# Patient Record
Sex: MALE | Race: WHITE | Employment: PART TIME | URBAN - METROPOLITAN AREA
[De-identification: names, ages, dates, MRNs, and addresses within clinical notes are randomized per-mention and may not be internally consistent; named-entity substitution may affect disease eponyms.]

---

## 2017-09-05 PROBLEM — R51.9 HEAD ACHE: Status: ACTIVE | Noted: 2017-09-05

## 2017-09-05 PROBLEM — R20.0 LT FACIAL NUMBNESS: Status: ACTIVE | Noted: 2017-09-05

## 2017-12-06 NOTE — PROGRESS NOTES
Obstructive Sleep Apnea (NEL) Screening     Patient:  Hossein Morin    YOB: 1955      Medical Record #:  [id number]                     Date:  12/6/2017     1. Are you a loud and/or regular snorer? []  Yes       [x] No    2. Have you been observed to gasp or stop breathing during sleep? []  Yes       [x] No    3. Do you feel tired or groggy upon awakening or do you awaken with a headache?           []  Yes       [x] No    4. Are you often tired or fatigued during the wake time hours? []  Yes       [x] No    5. Do you fall asleep sitting, reading, watching TV or driving? []  Yes       [x] No    6. Do you often have problems with memory or concentration? []  Yes       [x] No    **If patient's score is ? 3 they are considered high risk for NEL. Notify the anesthesiologist of the high risk and document in focus note. Note:  If the patient's BMI is more than 35 kg m¯² , has neck circumference > 40 cm, and/or high blood pressure the risk is greater (© American [employer], 2006).

## 2017-12-06 NOTE — PRE-PROCEDURE INSTRUCTIONS
PRE OP INSTRUCTION SHEET   1. Do not eat or drink anything after 12 midnight  prior to surgery. This includes no water, chewing gum or mints. 2. Take the following pills will a small sip of water (see MAR)                                        3. Aspirin, Ibuprofen, Advil, Naproxen, Vitamin E, fish oil and other Anti-inflammatory products should be stopped for 5 days before surgery or as directed by your physician. 4. Check with your Doctor regarding stopping Plavix, Coumadin, Lovenox, Fragmin or other blood thinners   5. Do not smoke, and do not drink any alcoholic beverages 24 hours prior to surgery. This includes NA Beer. 6. You may brush your teeth and gargle the morning of surgery. DO NOT SWALLOW WATER   7. You MUST make arrangements for a responsible adult to take you home after your surgery. You will not be allowed to leave alone or drive yourself home. It is strongly suggested someone stay with you the first 24 hrs. Your surgery will be cancelled if you do not have a ride home. 8. A parent/legal guardian must accompany a child scheduled for surgery and plan to stay at the hospital until the child is discharged. Please do not bring other children with you. 9. Please wear simple, loose fitting clothing to the hospital.  Mita Macdonaldcumb not bring valuables (money, credit cards, checkbooks, etc.) Do not wear any makeup (including no eye makeup) or nail polish on your fingers or toes. 10. DO NOT wear any jewelry or piercings on day of surgery. All body piercing jewelry must be removed. 11. If you have dentures,glasses, or contacts they will be removed before going to the OR; we will provide you a container. 12. Please see your family doctor/and cardiologist for a history & physical and/or concerning medications. Bring any test results/reports from your physician's office. Have history and labs faxed to [phone].  Remember to bring Blood Bank bracelet on the day of surgery. 14. If you have a Living Will and Durable Power of  for Healthcare, please bring in a copy. 13. Notify your Surgeon if you develop any illness between now and surgery  time, cough, cold, fever, sore throat, nausea, vomiting, etc.  Please notify your surgeon if you experience dizziness, shortness of breath or blurred vision between now & the time of your surgery   16. DO NOT shave your operative site 96 hours prior to surgery. For face & neck surgery, men may use an electric razor 48 hours prior to surgery. 17. Shower with _x__Antibacterial soap (x_chlorhexidine for total joint  Pt's) shower two times before surgery.(the morning of and the night before. 18. To provide excellent care visitors will be limited to one in the room at any given time.   Please call pre admission testing if you any further questions [phone] or [phone]

## 2017-12-08 ENCOUNTER — HOSPITAL ENCOUNTER (OUTPATIENT)
Dept: SURGERY | Age: 62
Discharge: HOME | End: 2017-12-08
Attending: UROLOGY | Admitting: UROLOGY

## 2017-12-08 VITALS
HEART RATE: 66 BPM | SYSTOLIC BLOOD PRESSURE: 122 MMHG | WEIGHT: 171 LBS | OXYGEN SATURATION: 100 % | DIASTOLIC BLOOD PRESSURE: 79 MMHG | BODY MASS INDEX: 25.33 KG/M2 | HEIGHT: 69 IN | TEMPERATURE: 98.7 F | RESPIRATION RATE: 18 BRPM

## 2017-12-08 RX ORDER — MORPHINE SULFATE 4 MG/ML
1 INJECTION, SOLUTION INTRAMUSCULAR; INTRAVENOUS EVERY 5 MIN PRN
Status: DISCONTINUED | OUTPATIENT
Start: 2017-12-08 | End: 2017-12-09 | Stop reason: HOSPADM

## 2017-12-08 RX ORDER — LABETALOL HYDROCHLORIDE 5 MG/ML
5 INJECTION, SOLUTION INTRAVENOUS EVERY 10 MIN PRN
Status: DISCONTINUED | OUTPATIENT
Start: 2017-12-08 | End: 2017-12-09 | Stop reason: HOSPADM

## 2017-12-08 RX ORDER — TOBRAMYCIN AND DEXAMETHASONE 3; 1 MG/ML; MG/ML
1 SUSPENSION/ DROPS OPHTHALMIC CONTINUOUS
Status: DISCONTINUED | OUTPATIENT
Start: 2017-12-08 | End: 2017-12-09 | Stop reason: HOSPADM

## 2017-12-08 RX ORDER — DIPHENHYDRAMINE HYDROCHLORIDE 50 MG/ML
12.5 INJECTION INTRAMUSCULAR; INTRAVENOUS
Status: ACTIVE | OUTPATIENT
Start: 2017-12-08 | End: 2017-12-08

## 2017-12-08 RX ORDER — PROMETHAZINE HYDROCHLORIDE 25 MG/ML
6.25 INJECTION, SOLUTION INTRAMUSCULAR; INTRAVENOUS
Status: DISCONTINUED | OUTPATIENT
Start: 2017-12-08 | End: 2017-12-09 | Stop reason: HOSPADM

## 2017-12-08 RX ORDER — ONDANSETRON 2 MG/ML
4 INJECTION INTRAMUSCULAR; INTRAVENOUS PRN
Status: DISCONTINUED | OUTPATIENT
Start: 2017-12-08 | End: 2017-12-09 | Stop reason: HOSPADM

## 2017-12-08 RX ORDER — HYDROMORPHONE HCL 110MG/55ML
0.25 PATIENT CONTROLLED ANALGESIA SYRINGE INTRAVENOUS EVERY 5 MIN PRN
Status: DISCONTINUED | OUTPATIENT
Start: 2017-12-08 | End: 2017-12-09 | Stop reason: HOSPADM

## 2017-12-08 RX ORDER — PREDNISOLONE ACETATE 10 MG/ML
1 SUSPENSION/ DROPS OPHTHALMIC CONTINUOUS
Status: DISCONTINUED | OUTPATIENT
Start: 2017-12-08 | End: 2017-12-09 | Stop reason: HOSPADM

## 2017-12-08 RX ORDER — SODIUM CHLORIDE, SODIUM LACTATE, POTASSIUM CHLORIDE, CALCIUM CHLORIDE 600; 310; 30; 20 MG/100ML; MG/100ML; MG/100ML; MG/100ML
INJECTION, SOLUTION INTRAVENOUS CONTINUOUS
Status: DISCONTINUED | OUTPATIENT
Start: 2017-12-08 | End: 2017-12-09 | Stop reason: HOSPADM

## 2017-12-08 RX ORDER — OXYCODONE HYDROCHLORIDE AND ACETAMINOPHEN 5; 325 MG/1; MG/1
2 TABLET ORAL PRN
Status: ACTIVE | OUTPATIENT
Start: 2017-12-08 | End: 2017-12-08

## 2017-12-08 RX ORDER — OXYCODONE HYDROCHLORIDE AND ACETAMINOPHEN 5; 325 MG/1; MG/1
1 TABLET ORAL PRN
Status: ACTIVE | OUTPATIENT
Start: 2017-12-08 | End: 2017-12-08

## 2017-12-08 RX ORDER — HYDROMORPHONE HCL 110MG/55ML
0.5 PATIENT CONTROLLED ANALGESIA SYRINGE INTRAVENOUS EVERY 5 MIN PRN
Status: DISCONTINUED | OUTPATIENT
Start: 2017-12-08 | End: 2017-12-09 | Stop reason: HOSPADM

## 2017-12-08 RX ORDER — MEPERIDINE HYDROCHLORIDE 25 MG/ML
12.5 INJECTION INTRAMUSCULAR; INTRAVENOUS; SUBCUTANEOUS EVERY 5 MIN PRN
Status: DISCONTINUED | OUTPATIENT
Start: 2017-12-08 | End: 2017-12-09 | Stop reason: HOSPADM

## 2017-12-08 RX ORDER — HYDRALAZINE HYDROCHLORIDE 20 MG/ML
5 INJECTION INTRAMUSCULAR; INTRAVENOUS EVERY 10 MIN PRN
Status: DISCONTINUED | OUTPATIENT
Start: 2017-12-08 | End: 2017-12-09 | Stop reason: HOSPADM

## 2017-12-08 RX ORDER — MORPHINE SULFATE 10 MG/ML
2 INJECTION, SOLUTION INTRAMUSCULAR; INTRAVENOUS EVERY 5 MIN PRN
Status: DISCONTINUED | OUTPATIENT
Start: 2017-12-08 | End: 2017-12-09 | Stop reason: HOSPADM

## 2017-12-08 RX ADMIN — SODIUM CHLORIDE, SODIUM LACTATE, POTASSIUM CHLORIDE, CALCIUM CHLORIDE: 600; 310; 30; 20 INJECTION, SOLUTION INTRAVENOUS at 07:07

## 2017-12-08 ASSESSMENT — PAIN SCALES - GENERAL: PAINLEVEL_OUTOF10: 0

## 2017-12-08 ASSESSMENT — PAIN - FUNCTIONAL ASSESSMENT: PAIN_FUNCTIONAL_ASSESSMENT: 0-10

## 2017-12-08 NOTE — OP NOTE
OPERATIVE NOTE    Patient Name   Date of Birth Age  MRNVitor Franco   [birth year]   66 y.o.  [id number]       Surgeon        Surgery Date  Shena Freitas        12/8/2017       Preoperative Diagnosis: Nuclear Sclerotic Cataract left eye    Postoperative Diagnosis: Nuclear Sclerotic Cataract left eye    Operative Procedure: Phacoemulsification/ Posterior Chamber Intraocular Lens Implantation          Anesthesia:   Peribulbar Block with MAC    Details of Procedure: The patient was brought to the operating room on the operative stretcher in the supine position with the head resting in the head rest.  After appropriate anesthesia monitoring devices were applied, IV sedation was carried out per the anesthesia service. Once sedation was achieved, a peribulbar block was performed, using a 5 mL combination solution containing 4 mL of 2% lidocaine with 1 mL of Vitrase. Approximately 2-3 mL of this solution was administered in a peribulbar fashion through the lower lid of the operative eye. Once appropriate akinesia and anesthesia were demonstrated, the operative eye was prepped and draped in the usual sterile fashion. Tobradex drops and Tetracain drops were administered. A wire lid speculum was then inserted into the operative eye. A paracentesis was then performed using a 15 degree supersharp blade to enter the globe at the limbus, and a .12 mm colibri forceps was used to stabilize the globe. Viscoat was then instilled into the anterior chamber. A temporal clear cornea groove was then created using the 15 degree supersharp blade. The cornea was then entered using the Collin 2.4 mm clearcut keratome blade. The capsulotomy was then performed using a cystotome, and the capsulorrhexis was completed using uttrata forceps. The nucleus of the cataract was then hydrodissected and hydrodelineated using sterile BSS on a 27 gauge cannula.   The nucleus of the cataract was then removed using the

## 2017-12-08 NOTE — ANESTHESIA POST-OP
Postoperative Anesthesia Note    Name:    Padilla Roberson  MRN:      [id number]    Patient Vitals for the past 12 hrs:   BP Temp Temp src Pulse Resp SpO2   12/08/17 0910 122/79 - - 66 18 100 %   12/08/17 0639 129/82 98.7 °F (37.1 °C) Temporal 61 16 99 %        LABS:    CBC  Lab Results   Component Value Date/Time    WBC 6.6 09/06/2017 08:10 AM    HGB 15.6 09/06/2017 08:10 AM    HCT 45.0 09/06/2017 08:10 AM     09/06/2017 08:10 AM     RENAL  Lab Results   Component Value Date/Time     09/06/2017 08:10 AM    K 4.0 09/06/2017 08:10 AM     09/06/2017 08:10 AM    CO2 26 09/06/2017 08:10 AM    BUN 14 09/06/2017 08:10 AM    CREATININE 0.8 09/06/2017 08:10 AM    GLUCOSE 95 09/06/2017 08:10 AM     COAGS  Lab Results   Component Value Date/Time    PROTIME 10.7 09/05/2017 01:35 AM    INR 0.95 09/05/2017 01:35 AM       Intake & Output: In: 400 [I.V.:400]  Out: -     Nausea & Vomiting:  No    Level of Consciousness:  Awake    Pain Assessment:  Adequate analgesia    Anesthesia Complications:  No apparent anesthetic complications    SUMMARY      Vital signs stable  OK to discharge from Stage I post anesthesia care.   Care transferred from Anesthesiology department on discharge from perioperative area

## 2017-12-08 NOTE — ANESTHESIA PRE-OP
Continuous Berny Philip MD        lactated ringers infusion   Intravenous Continuous Eldon Webster MD        lidocaine 1 % (PF) injection 0.1 mL  0.1 mL Intradermal Once PRN Eldon Webster MD        lidocaine PF 2 % in hylenex   Intraocular Once Sandeep Hill MD           Allergies: Allergies   Allergen Reactions    Cephalexin Swelling       Problem List:    Patient Active Problem List   Diagnosis Code    Head ache R51    Lt facial numbness R20.0    Acute nonintractable headache R51    TA (temporal arteritis) ([facility] [address] M31.6    HTN (hypertension), benign I10       Past Medical History:        Diagnosis Date    Abdominal pain     Degenerative disc disease     Hyperlipidemia     Hypertension        Past Surgical History:        Procedure Laterality Date    HERNIA REPAIR         Social History:    Social History   Substance Use Topics    Smoking status: Former Smoker    Smokeless tobacco: Never Used      Comment: quit 36 years ago; 1/2 per day for 10 years    Alcohol use No                                Counseling given: Not Answered      Vital Signs (Current):   Vitals:    12/06/17 1007 12/08/17 0639   BP:  129/82   Pulse:  61   Resp:  16   Temp:  98.7 °F (37.1 °C)   TempSrc:  Temporal   SpO2:  99%   Weight: 171 lb (77.6 kg)    Height: 5' 9\" (1.753 m)                                               BP Readings from Last 3 Encounters:   12/08/17 129/82   09/06/17 113/67   06/21/11 136/84       NPO Status: Time of last liquid consumption: 1800                        Time of last solid consumption: 1800                        Date of last liquid consumption: 12/07/17                        Date of last solid food consumption: 12/07/17    BMI:   Wt Readings from Last 3 Encounters:   12/06/17 171 lb (77.6 kg)   09/05/17 166 lb 10.7 oz (75.6 kg)   06/21/11 165 lb (74.8 kg)     Body mass index is 25.25 kg/m².     Anesthesia Evaluation  Patient summary reviewed and Nursing MD Hector   12/8/2017

## 2022-02-09 ENCOUNTER — APPOINTMENT (OUTPATIENT)
Dept: GENERAL RADIOLOGY | Age: 67
End: 2022-02-09
Payer: MEDICARE

## 2022-02-09 ENCOUNTER — APPOINTMENT (OUTPATIENT)
Dept: CT IMAGING | Age: 67
End: 2022-02-09
Payer: MEDICARE

## 2022-02-09 ENCOUNTER — HOSPITAL ENCOUNTER (EMERGENCY)
Age: 67
Discharge: HOME | End: 2022-02-09
Attending: EMERGENCY MEDICINE
Payer: MEDICARE

## 2022-02-09 VITALS
HEART RATE: 71 BPM | OXYGEN SATURATION: 99 % | BODY MASS INDEX: 23.85 KG/M2 | DIASTOLIC BLOOD PRESSURE: 83 MMHG | WEIGHT: 161 LBS | TEMPERATURE: 98.1 F | HEIGHT: 69 IN | SYSTOLIC BLOOD PRESSURE: 147 MMHG | RESPIRATION RATE: 15 BRPM
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
## 2022-02-09 DIAGNOSIS — R07.89 CHEST WALL PAIN: ICD-10-CM

## 2022-02-09 DIAGNOSIS — S62.102A CLOSED FRACTURE OF LEFT WRIST, INITIAL ENCOUNTER: Primary | ICD-10-CM

## 2022-02-09 LAB
A/G RATIO: 1.8 (ref 1.1–2.2)
ALBUMIN SERPL-MCNC: 4.5 G/DL (ref 3.4–5)
ALP BLD-CCNC: 89 U/L (ref 40–129)
ALT SERPL-CCNC: 13 U/L (ref 10–40)
ANION GAP SERPL CALCULATED.3IONS-SCNC: 10 MMOL/L (ref 3–16)
AST SERPL-CCNC: 14 U/L (ref 15–37)
BASOPHILS ABSOLUTE: 0.1 K/UL (ref 0–0.2)
BASOPHILS RELATIVE PERCENT: 1.2 %
BILIRUB SERPL-MCNC: 0.4 MG/DL (ref 0–1)
BUN BLDV-MCNC: 11 MG/DL (ref 7–20)
CALCIUM SERPL-MCNC: 9.6 MG/DL (ref 8.3–10.6)
CHLORIDE BLD-SCNC: 100 MMOL/L (ref 99–110)
CO2: 28 MMOL/L (ref 21–32)
CREAT SERPL-MCNC: 0.9 MG/DL (ref 0.8–1.3)
EOSINOPHILS ABSOLUTE: 0.2 K/UL (ref 0–0.6)
EOSINOPHILS RELATIVE PERCENT: 2.9 %
GFR AFRICAN AMERICAN: >60
GFR NON-AFRICAN AMERICAN: >60
GLUCOSE BLD-MCNC: 92 MG/DL (ref 70–99)
HCT VFR BLD CALC: 40.4 % (ref 40.5–52.5)
HEMOGLOBIN: 13.9 G/DL (ref 13.5–17.5)
LYMPHOCYTES ABSOLUTE: 2.1 K/UL (ref 1–5.1)
LYMPHOCYTES RELATIVE PERCENT: 38.4 %
MCH RBC QN AUTO: 33.1 PG (ref 26–34)
MCHC RBC AUTO-ENTMCNC: 34.4 G/DL (ref 31–36)
MCV RBC AUTO: 96.2 FL (ref 80–100)
MONOCYTES ABSOLUTE: 0.4 K/UL (ref 0–1.3)
MONOCYTES RELATIVE PERCENT: 6.9 %
NEUTROPHILS ABSOLUTE: 2.8 K/UL (ref 1.7–7.7)
NEUTROPHILS RELATIVE PERCENT: 50.6 %
PDW BLD-RTO: 14.1 % (ref 12.4–15.4)
PLATELET # BLD: 313 K/UL (ref 135–450)
PMV BLD AUTO: 6.6 FL (ref 5–10.5)
POTASSIUM REFLEX MAGNESIUM: 4.2 MMOL/L (ref 3.5–5.1)
RBC # BLD: 4.2 M/UL (ref 4.2–5.9)
SODIUM BLD-SCNC: 138 MMOL/L (ref 136–145)
SPECIMEN STATUS: NORMAL
TOTAL CK: 120 U/L (ref 39–308)
TOTAL PROTEIN: 7 G/DL (ref 6.4–8.2)
TROPONIN: <0.01 NG/ML
WBC # BLD: 5.5 K/UL (ref 4–11)

## 2022-02-09 PROCEDURE — 84484 ASSAY OF TROPONIN QUANT: CPT

## 2022-02-09 PROCEDURE — 80053 COMPREHEN METABOLIC PANEL: CPT

## 2022-02-09 PROCEDURE — 99284 EMERGENCY DEPT VISIT MOD MDM: CPT

## 2022-02-09 PROCEDURE — 29125 APPL SHORT ARM SPLINT STATIC: CPT

## 2022-02-09 PROCEDURE — 6370000000 HC RX 637 (ALT 250 FOR IP): Performed by: EMERGENCY MEDICINE

## 2022-02-09 PROCEDURE — 93005 ELECTROCARDIOGRAM TRACING: CPT | Performed by: EMERGENCY MEDICINE

## 2022-02-09 PROCEDURE — 73110 X-RAY EXAM OF WRIST: CPT

## 2022-02-09 PROCEDURE — 82550 ASSAY OF CK (CPK): CPT

## 2022-02-09 PROCEDURE — 6360000002 HC RX W HCPCS: Performed by: EMERGENCY MEDICINE

## 2022-02-09 PROCEDURE — 29130 APPL FINGER SPLINT STATIC: CPT

## 2022-02-09 PROCEDURE — 72125 CT NECK SPINE W/O DYE: CPT

## 2022-02-09 PROCEDURE — 85025 COMPLETE CBC W/AUTO DIFF WBC: CPT

## 2022-02-09 PROCEDURE — 70450 CT HEAD/BRAIN W/O DYE: CPT

## 2022-02-09 PROCEDURE — 71250 CT THORAX DX C-: CPT

## 2022-02-09 RX ORDER — OXYCODONE HYDROCHLORIDE AND ACETAMINOPHEN 5; 325 MG/1; MG/1
1 TABLET ORAL EVERY 6 HOURS PRN
Qty: 12 TABLET | Refills: 0 | Status: SHIPPED | OUTPATIENT
Start: 2022-02-09 | End: 2022-02-12

## 2022-02-09 RX ORDER — ACETAMINOPHEN 500 MG
1000 TABLET ORAL ONCE
Status: COMPLETED | OUTPATIENT
Start: 2022-02-09 | End: 2022-02-09

## 2022-02-09 RX ORDER — ONDANSETRON 2 MG/ML
4 INJECTION INTRAMUSCULAR; INTRAVENOUS ONCE
Status: DISCONTINUED | OUTPATIENT
Start: 2022-02-09 | End: 2022-02-09 | Stop reason: HOSPADM

## 2022-02-09 RX ORDER — LIDOCAINE 4 G/G
1 PATCH TOPICAL DAILY PRN
Qty: 10 PATCH | Refills: 0 | Status: SHIPPED | OUTPATIENT
Start: 2022-02-09 | End: 2022-02-19

## 2022-02-09 RX ORDER — SODIUM CHLORIDE 9 MG/ML
1000 INJECTION, SOLUTION INTRAVENOUS CONTINUOUS
Status: DISCONTINUED | OUTPATIENT
Start: 2022-02-09 | End: 2022-02-09 | Stop reason: HOSPADM

## 2022-02-09 RX ADMIN — ACETAMINOPHEN 1000 MG: 500 TABLET ORAL at 19:00

## 2022-02-09 ASSESSMENT — PAIN SCALES - GENERAL
PAINLEVEL_OUTOF10: 5
PAINLEVEL_OUTOF10: 8
PAINLEVEL_OUTOF10: 6
PAINLEVEL_OUTOF10: 7

## 2022-02-09 ASSESSMENT — PAIN DESCRIPTION - DESCRIPTORS: DESCRIPTORS: CONSTANT

## 2022-02-09 ASSESSMENT — PAIN DESCRIPTION - ORIENTATION: ORIENTATION: LEFT

## 2022-02-09 ASSESSMENT — PAIN DESCRIPTION - LOCATION: LOCATION: CHEST;WRIST

## 2022-02-09 ASSESSMENT — PAIN DESCRIPTION - PAIN TYPE: TYPE: ACUTE PAIN

## 2022-02-09 NOTE — ED NOTES
Bed: 02  Expected date:   Expected time:   Means of arrival:   Comments:  [address], RN  02/09/22 7333

## 2022-02-10 LAB
EKG ATRIAL RATE: 59 BPM
EKG DIAGNOSIS: NORMAL
EKG P AXIS: 50 DEGREES
EKG P-R INTERVAL: 150 MS
EKG Q-T INTERVAL: 394 MS
EKG QRS DURATION: 100 MS
EKG QTC CALCULATION (BAZETT): 390 MS
EKG R AXIS: -35 DEGREES
EKG T AXIS: 40 DEGREES
EKG VENTRICULAR RATE: 59 BPM

## 2022-02-10 PROCEDURE — 93010 ELECTROCARDIOGRAM REPORT: CPT | Performed by: INTERNAL MEDICINE

## 2022-02-10 NOTE — ED NOTES
Discharge and incentive spirometer instructions  given. Verbalized understanding. Denies further questions or concerns. A&OX4. Ambulates from ED with steady gait.       Smith Browning RN  02/09/22 2017
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
## 2022-02-11 ENCOUNTER — OFFICE VISIT (OUTPATIENT)
Dept: ORTHOPEDIC SURGERY | Age: 67
End: 2022-02-11
Payer: MEDICARE

## 2022-02-11 VITALS — HEIGHT: 69 IN | WEIGHT: 161 LBS | BODY MASS INDEX: 23.85 KG/M2

## 2022-02-11 DIAGNOSIS — S92.425A CLOSED NONDISPLACED FRACTURE OF DISTAL PHALANX OF LEFT GREAT TOE, INITIAL ENCOUNTER: Primary | ICD-10-CM

## 2022-02-11 DIAGNOSIS — S52.502A CLOSED FRACTURE OF DISTAL END OF LEFT RADIUS, UNSPECIFIED FRACTURE MORPHOLOGY, INITIAL ENCOUNTER: ICD-10-CM

## 2022-02-11 PROCEDURE — 99204 OFFICE O/P NEW MOD 45 MIN: CPT | Performed by: ORTHOPAEDIC SURGERY

## 2022-02-11 PROCEDURE — G8420 CALC BMI NORM PARAMETERS: HCPCS | Performed by: ORTHOPAEDIC SURGERY

## 2022-02-11 PROCEDURE — G8427 DOCREV CUR MEDS BY ELIG CLIN: HCPCS | Performed by: ORTHOPAEDIC SURGERY

## 2022-02-11 PROCEDURE — G8484 FLU IMMUNIZE NO ADMIN: HCPCS | Performed by: ORTHOPAEDIC SURGERY

## 2022-02-11 PROCEDURE — 25600 CLTX DST RDL FX/EPHYS SEP WO: CPT | Performed by: ORTHOPAEDIC SURGERY

## 2022-02-11 PROCEDURE — 28490 TREAT BIG TOE FRACTURE: CPT | Performed by: ORTHOPAEDIC SURGERY
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
## 2022-03-25 ENCOUNTER — OFFICE VISIT (OUTPATIENT)
Dept: ORTHOPEDIC SURGERY | Age: 67
End: 2022-03-25

## 2022-03-25 VITALS — WEIGHT: 161 LBS | HEIGHT: 69 IN | BODY MASS INDEX: 23.85 KG/M2

## 2022-03-25 DIAGNOSIS — S52.502A CLOSED FRACTURE OF DISTAL END OF LEFT RADIUS, UNSPECIFIED FRACTURE MORPHOLOGY, INITIAL ENCOUNTER: ICD-10-CM

## 2022-03-25 DIAGNOSIS — S92.425A CLOSED NONDISPLACED FRACTURE OF DISTAL PHALANX OF LEFT GREAT TOE, INITIAL ENCOUNTER: Primary | ICD-10-CM

## 2022-03-25 PROCEDURE — 99024 POSTOP FOLLOW-UP VISIT: CPT | Performed by: ORTHOPAEDIC SURGERY

## 2022-03-25 NOTE — PROGRESS NOTES
[address] [place] [address] [facility]  Outpatient Progress Note  Sara Hernandez MD    Patient Name: Aspen Seo MRN: [id number]   Age: 78 y.o. YOB: 1955   Sex: male      [address] Complaint   Patient presents with    Follow-up     lt wrist distal radius fracture 4 week with xrays    Follow-up     Lt great toe fracture 4 weeks with xrays       HISTORY OF PRESENT ILLNESS   Aspen Seo is a 78 y.o. male  The patient presents for follow up on their fracture. Patient reports no pain in either his great toe or his left wrist.  Does not use the wrist brace. Has been ambulating in regular street shoes    PAST MEDICAL HISTORY      Past Medical History:   Diagnosis Date    Abdominal pain     COVID-19     Degenerative disc disease     Hyperlipidemia     Hypertension        PAST SURGICAL HISTORY     Past Surgical History:   Procedure Laterality Date    EYE SURGERY Left     cataract    HERNIA REPAIR         MEDICATIONS     Current Outpatient Medications   Medication Sig Dispense Refill    aspirin 81 MG EC tablet Take 1 tablet by mouth daily 30 tablet 3    atorvastatin (LIPITOR) 40 MG tablet Take 1 tablet by mouth nightly 30 tablet 0    lisinopril (PRINIVIL;ZESTRIL) 2.5 MG tablet Take 2.5 mg by mouth daily      Coenzyme Q10 15 MG TABS Take 1 tablet by mouth      fish oil-omega-3 fatty acids 1000 MG capsule Take 2 g by mouth daily. No current facility-administered medications for this visit.        ALLERGIES     Allergies   Allergen Reactions    Cephalexin Swelling       FAMILY HISTORY     Family History   Problem Relation Age of Onset    Cancer Mother         breast    Cancer Maternal Grandmother         stomach tumor    Cancer Paternal Grandfather         leukemia       SOCIAL HISTORY     Social History     Socioeconomic History    Marital status:      Spouse name: Not on file    Number of children: Not on file    Years of education: Not on file  Highest education level: Not on file   Occupational History    Not on file   Tobacco Use    Smoking status: Former Smoker    Smokeless tobacco: Never Used    Tobacco comment: quit 36 years ago; 1/2 per day for 10 years   Substance and Sexual Activity    Alcohol use: No    Drug use: No    Sexual activity: Not on file   Other Topics Concern    Not on file   Social History Narrative    Not on file     Social Determinants of Health     Financial Resource Strain:     Difficulty of Paying Living Expenses: Not on file   Food Insecurity:     Worried About Running Out of Food in the Last Year: Not on file    [employer] in the Last Year: Not on file   Transportation Needs:     Lack of Transportation (Medical): Not on file    Lack of Transportation (Non-Medical):  Not on file   Physical Activity:     Days of Exercise per Week: Not on file    Minutes of Exercise per Session: Not on file   Stress:     Feeling of Stress : Not on file   Social Connections:     Frequency of Communication with Friends and Family: Not on file    Frequency of Social Gatherings with Friends and Family: Not on file    Attends Jew Services: Not on file    Active Member of [address] [place] [address] or Organizations: Not on file    Attends Club or Organization Meetings: Not on file    Marital Status: Not on file   Intimate Partner Violence:     Fear of Current or Ex-Partner: Not on file    Emotionally Abused: Not on file    Physically Abused: Not on file    Sexually Abused: Not on file   Housing Stability:     Unable to Pay for Housing in the Last Year: Not on file    Number of [place] in the Last Year: Not on file    Unstable Housing in the Last Year: Not on file       REVIEW OF SYSTEMS   General: no fever, chills, night sweats, anorexia, malaise, fatigue, or weight change  Hematologic:  no unexplained bleeding or bruising  HEENT:   no nasal congestion, rhinorrhea, sore throat, or facial pain  Respiratory:  no cough, dyspnea,

## 2023-06-25 ENCOUNTER — APPOINTMENT (OUTPATIENT)
Dept: CT IMAGING | Age: 68
End: 2023-06-25
Payer: MEDICARE

## 2023-06-25 ENCOUNTER — APPOINTMENT (OUTPATIENT)
Dept: GENERAL RADIOLOGY | Age: 68
End: 2023-06-25
Payer: MEDICARE

## 2023-06-25 ENCOUNTER — HOSPITAL ENCOUNTER (OUTPATIENT)
Age: 68
Setting detail: OBSERVATION
Discharge: HOME | End: 2023-06-27
Attending: EMERGENCY MEDICINE
Payer: MEDICARE

## 2023-06-25 DIAGNOSIS — R20.0 LEFT SIDED NUMBNESS: Primary | ICD-10-CM

## 2023-06-25 LAB
ALBUMIN SERPL-MCNC: 4.4 G/DL (ref 3.4–5)
ALBUMIN/GLOB SERPL: 1.5 {RATIO} (ref 1.1–2.2)
ALP SERPL-CCNC: 69 U/L (ref 40–129)
ALT SERPL-CCNC: 14 U/L (ref 10–40)
ANION GAP SERPL CALCULATED.3IONS-SCNC: 11 MMOL/L (ref 3–16)
AST SERPL-CCNC: 15 U/L (ref 15–37)
BASOPHILS # BLD: 0.1 K/UL (ref 0–0.2)
BASOPHILS NFR BLD: 1.3 %
BILIRUB SERPL-MCNC: <0.2 MG/DL (ref 0–1)
BUN SERPL-MCNC: 17 MG/DL (ref 7–20)
CALCIUM SERPL-MCNC: 9.5 MG/DL (ref 8.3–10.6)
CHLORIDE SERPL-SCNC: 103 MMOL/L (ref 99–110)
CO2 SERPL-SCNC: 27 MMOL/L (ref 21–32)
CREAT SERPL-MCNC: 1 MG/DL (ref 0.8–1.3)
DEPRECATED RDW RBC AUTO: 13.1 % (ref 12.4–15.4)
EKG ATRIAL RATE: 65 BPM
EKG DIAGNOSIS: NORMAL
EKG P AXIS: 47 DEGREES
EKG P-R INTERVAL: 162 MS
EKG Q-T INTERVAL: 382 MS
EKG QRS DURATION: 88 MS
EKG QTC CALCULATION (BAZETT): 397 MS
EKG R AXIS: -48 DEGREES
EKG T AXIS: 41 DEGREES
EKG VENTRICULAR RATE: 65 BPM
EOSINOPHIL # BLD: 0.3 K/UL (ref 0–0.6)
EOSINOPHIL NFR BLD: 5.7 %
GFR SERPLBLD CREATININE-BSD FMLA CKD-EPI: >60 ML/MIN/{1.73_M2}
GLUCOSE SERPL-MCNC: 97 MG/DL (ref 70–99)
HCT VFR BLD AUTO: 41.5 % (ref 40.5–52.5)
HGB BLD-MCNC: 14.4 G/DL (ref 13.5–17.5)
INR PPP: 0.95 (ref 0.84–1.16)
LYMPHOCYTES # BLD: 2.2 K/UL (ref 1–5.1)
LYMPHOCYTES NFR BLD: 39.7 %
MAGNESIUM SERPL-MCNC: 2.5 MG/DL (ref 1.8–2.4)
MCH RBC QN AUTO: 33.3 PG (ref 26–34)
MCHC RBC AUTO-ENTMCNC: 34.8 G/DL (ref 31–36)
MCV RBC AUTO: 95.8 FL (ref 80–100)
MONOCYTES # BLD: 0.4 K/UL (ref 0–1.3)
MONOCYTES NFR BLD: 7.9 %
NEUTROPHILS # BLD: 2.6 K/UL (ref 1.7–7.7)
NEUTROPHILS NFR BLD: 45.4 %
PLATELET # BLD AUTO: 270 K/UL (ref 135–450)
PMV BLD AUTO: 7.1 FL (ref 5–10.5)
POTASSIUM SERPL-SCNC: 4.3 MMOL/L (ref 3.5–5.1)
PROT SERPL-MCNC: 7.3 G/DL (ref 6.4–8.2)
PROTHROMBIN TIME: 12.7 SEC (ref 11.5–14.8)
RBC # BLD AUTO: 4.33 M/UL (ref 4.2–5.9)
SODIUM SERPL-SCNC: 141 MMOL/L (ref 136–145)
TROPONIN, HIGH SENSITIVITY: <6 NG/L (ref 0–22)
WBC # BLD AUTO: 5.6 K/UL (ref 4–11)

## 2023-06-25 PROCEDURE — G0378 HOSPITAL OBSERVATION PER HR: HCPCS

## 2023-06-25 PROCEDURE — 70450 CT HEAD/BRAIN W/O DYE: CPT

## 2023-06-25 PROCEDURE — 99285 EMERGENCY DEPT VISIT HI MDM: CPT

## 2023-06-25 PROCEDURE — 83735 ASSAY OF MAGNESIUM: CPT

## 2023-06-25 PROCEDURE — 84484 ASSAY OF TROPONIN QUANT: CPT

## 2023-06-25 PROCEDURE — 85610 PROTHROMBIN TIME: CPT

## 2023-06-25 PROCEDURE — 70498 CT ANGIOGRAPHY NECK: CPT

## 2023-06-25 PROCEDURE — 83036 HEMOGLOBIN GLYCOSYLATED A1C: CPT

## 2023-06-25 PROCEDURE — 85025 COMPLETE CBC W/AUTO DIFF WBC: CPT

## 2023-06-25 PROCEDURE — 93005 ELECTROCARDIOGRAM TRACING: CPT | Performed by: EMERGENCY MEDICINE

## 2023-06-25 PROCEDURE — 71045 X-RAY EXAM CHEST 1 VIEW: CPT

## 2023-06-25 PROCEDURE — 6360000004 HC RX CONTRAST MEDICATION: Performed by: EMERGENCY MEDICINE

## 2023-06-25 PROCEDURE — 80053 COMPREHEN METABOLIC PANEL: CPT

## 2023-06-25 PROCEDURE — 80061 LIPID PANEL: CPT

## 2023-06-25 PROCEDURE — 6370000000 HC RX 637 (ALT 250 FOR IP)

## 2023-06-25 PROCEDURE — 93010 ELECTROCARDIOGRAM REPORT: CPT | Performed by: INTERNAL MEDICINE

## 2023-06-25 PROCEDURE — 6360000002 HC RX W HCPCS

## 2023-06-25 RX ORDER — ONDANSETRON 4 MG/1
4 TABLET, ORALLY DISINTEGRATING ORAL EVERY 8 HOURS PRN
Status: DISCONTINUED | OUTPATIENT
Start: 2023-06-25 | End: 2023-06-27 | Stop reason: HOSPADM

## 2023-06-25 RX ORDER — CLOPIDOGREL BISULFATE 75 MG/1
75 TABLET ORAL DAILY
Status: DISCONTINUED | OUTPATIENT
Start: 2023-06-25 | End: 2023-06-27 | Stop reason: HOSPADM

## 2023-06-25 RX ORDER — ENOXAPARIN SODIUM 100 MG/ML
40 INJECTION SUBCUTANEOUS DAILY
Status: DISCONTINUED | OUTPATIENT
Start: 2023-06-25 | End: 2023-06-27 | Stop reason: HOSPADM

## 2023-06-25 RX ORDER — POLYETHYLENE GLYCOL 3350 17 G/17G
17 POWDER, FOR SOLUTION ORAL DAILY PRN
Status: DISCONTINUED | OUTPATIENT
Start: 2023-06-25 | End: 2023-06-27 | Stop reason: HOSPADM

## 2023-06-25 RX ORDER — ATORVASTATIN CALCIUM 40 MG/1
40 TABLET, FILM COATED ORAL NIGHTLY
Status: DISCONTINUED | OUTPATIENT
Start: 2023-06-25 | End: 2023-06-27 | Stop reason: HOSPADM

## 2023-06-25 RX ORDER — ONDANSETRON 2 MG/ML
4 INJECTION INTRAMUSCULAR; INTRAVENOUS EVERY 6 HOURS PRN
Status: DISCONTINUED | OUTPATIENT
Start: 2023-06-25 | End: 2023-06-27 | Stop reason: HOSPADM

## 2023-06-25 RX ORDER — ASPIRIN 81 MG/1
81 TABLET ORAL DAILY
Status: DISCONTINUED | OUTPATIENT
Start: 2023-06-26 | End: 2023-06-27 | Stop reason: HOSPADM

## 2023-06-25 RX ORDER — OMEGA-3-ACID ETHYL ESTERS 1 G/1
2 CAPSULE, LIQUID FILLED ORAL DAILY
Status: DISCONTINUED | OUTPATIENT
Start: 2023-06-25 | End: 2023-06-27 | Stop reason: HOSPADM

## 2023-06-25 RX ADMIN — ATORVASTATIN CALCIUM 40 MG: 40 TABLET, FILM COATED ORAL at 20:34

## 2023-06-25 RX ADMIN — CLOPIDOGREL BISULFATE 75 MG: 75 TABLET ORAL at 16:31

## 2023-06-25 RX ADMIN — IOPAMIDOL 75 ML: 755 INJECTION, SOLUTION INTRAVENOUS at 12:21

## 2023-06-25 RX ADMIN — ENOXAPARIN SODIUM 40 MG: 100 INJECTION SUBCUTANEOUS at 16:31

## 2023-06-25 ASSESSMENT — PAIN DESCRIPTION - LOCATION: LOCATION: CHEST

## 2023-06-25 ASSESSMENT — PAIN SCALES - GENERAL: PAINLEVEL_OUTOF10: 0

## 2023-06-25 ASSESSMENT — PAIN - FUNCTIONAL ASSESSMENT: PAIN_FUNCTIONAL_ASSESSMENT: 0-10

## 2023-06-26 ENCOUNTER — APPOINTMENT (OUTPATIENT)
Dept: MRI IMAGING | Age: 68
End: 2023-06-26
Payer: MEDICARE

## 2023-06-26 LAB
ANION GAP SERPL CALCULATED.3IONS-SCNC: 10 MMOL/L (ref 3–16)
BUN SERPL-MCNC: 15 MG/DL (ref 7–20)
CALCIUM SERPL-MCNC: 9.4 MG/DL (ref 8.3–10.6)
CHLORIDE SERPL-SCNC: 105 MMOL/L (ref 99–110)
CHOLEST SERPL-MCNC: 212 MG/DL (ref 0–199)
CO2 SERPL-SCNC: 24 MMOL/L (ref 21–32)
CREAT SERPL-MCNC: 0.8 MG/DL (ref 0.8–1.3)
DEPRECATED RDW RBC AUTO: 13.1 % (ref 12.4–15.4)
EST. AVERAGE GLUCOSE BLD GHB EST-MCNC: 105.4 MG/DL
GFR SERPLBLD CREATININE-BSD FMLA CKD-EPI: >60 ML/MIN/{1.73_M2}
GLUCOSE SERPL-MCNC: 101 MG/DL (ref 70–99)
HBA1C MFR BLD: 5.3 %
HCT VFR BLD AUTO: 41.6 % (ref 40.5–52.5)
HDLC SERPL-MCNC: 51 MG/DL (ref 40–60)
HGB BLD-MCNC: 14.1 G/DL (ref 13.5–17.5)
LDLC SERPL CALC-MCNC: 127 MG/DL
LV EF: 58 %
LVEF MODALITY: NORMAL
MCH RBC QN AUTO: 32.5 PG (ref 26–34)
MCHC RBC AUTO-ENTMCNC: 33.9 G/DL (ref 31–36)
MCV RBC AUTO: 95.9 FL (ref 80–100)
PLATELET # BLD AUTO: 262 K/UL (ref 135–450)
PMV BLD AUTO: 6.5 FL (ref 5–10.5)
POTASSIUM SERPL-SCNC: 3.9 MMOL/L (ref 3.5–5.1)
RBC # BLD AUTO: 4.34 M/UL (ref 4.2–5.9)
SODIUM SERPL-SCNC: 139 MMOL/L (ref 136–145)
TRIGL SERPL-MCNC: 171 MG/DL (ref 0–150)
VLDLC SERPL CALC-MCNC: 34 MG/DL
WBC # BLD AUTO: 5.6 K/UL (ref 4–11)

## 2023-06-26 PROCEDURE — 92526 ORAL FUNCTION THERAPY: CPT

## 2023-06-26 PROCEDURE — 97530 THERAPEUTIC ACTIVITIES: CPT

## 2023-06-26 PROCEDURE — 36415 COLL VENOUS BLD VENIPUNCTURE: CPT

## 2023-06-26 PROCEDURE — 6370000000 HC RX 637 (ALT 250 FOR IP)

## 2023-06-26 PROCEDURE — 85027 COMPLETE CBC AUTOMATED: CPT

## 2023-06-26 PROCEDURE — 70551 MRI BRAIN STEM W/O DYE: CPT

## 2023-06-26 PROCEDURE — 93306 TTE W/DOPPLER COMPLETE: CPT

## 2023-06-26 PROCEDURE — 97165 OT EVAL LOW COMPLEX 30 MIN: CPT

## 2023-06-26 PROCEDURE — 80048 BASIC METABOLIC PNL TOTAL CA: CPT

## 2023-06-26 PROCEDURE — 92610 EVALUATE SWALLOWING FUNCTION: CPT

## 2023-06-26 PROCEDURE — 99223 1ST HOSP IP/OBS HIGH 75: CPT | Performed by: PSYCHIATRY & NEUROLOGY

## 2023-06-26 PROCEDURE — G0378 HOSPITAL OBSERVATION PER HR: HCPCS

## 2023-06-26 PROCEDURE — 6360000002 HC RX W HCPCS

## 2023-06-26 PROCEDURE — 97161 PT EVAL LOW COMPLEX 20 MIN: CPT

## 2023-06-26 RX ORDER — CLOPIDOGREL BISULFATE 75 MG/1
75 TABLET ORAL DAILY
Qty: 19 TABLET | Refills: 0 | Status: SHIPPED | OUTPATIENT
Start: 2023-06-27 | End: 2023-07-16

## 2023-06-26 RX ORDER — ATORVASTATIN CALCIUM 40 MG/1
40 TABLET, FILM COATED ORAL NIGHTLY
Qty: 30 TABLET | Refills: 0 | Status: SHIPPED | OUTPATIENT
Start: 2023-06-26

## 2023-06-26 RX ADMIN — ASPIRIN 81 MG: 81 TABLET, COATED ORAL at 08:31

## 2023-06-26 RX ADMIN — ATORVASTATIN CALCIUM 40 MG: 40 TABLET, FILM COATED ORAL at 19:57

## 2023-06-26 RX ADMIN — OMEGA-3-ACID ETHYL ESTERS 2 G: 1 CAPSULE, LIQUID FILLED ORAL at 08:31

## 2023-06-26 RX ADMIN — CLOPIDOGREL BISULFATE 75 MG: 75 TABLET ORAL at 08:31

## 2023-06-26 RX ADMIN — ENOXAPARIN SODIUM 40 MG: 100 INJECTION SUBCUTANEOUS at 08:32

## 2023-06-26 ASSESSMENT — PAIN SCALES - GENERAL: PAINLEVEL_OUTOF10: 0

## 2023-06-27 VITALS
RESPIRATION RATE: 18 BRPM | HEART RATE: 61 BPM | HEIGHT: 68 IN | OXYGEN SATURATION: 95 % | SYSTOLIC BLOOD PRESSURE: 128 MMHG | DIASTOLIC BLOOD PRESSURE: 73 MMHG | TEMPERATURE: 98.5 F | BODY MASS INDEX: 23.31 KG/M2 | WEIGHT: 153.8 LBS

## 2023-06-27 PROCEDURE — G0378 HOSPITAL OBSERVATION PER HR: HCPCS

## 2023-06-27 PROCEDURE — 6360000002 HC RX W HCPCS

## 2023-06-27 PROCEDURE — 95816 EEG AWAKE AND DROWSY: CPT

## 2023-06-27 PROCEDURE — 6370000000 HC RX 637 (ALT 250 FOR IP)

## 2023-06-27 RX ADMIN — OMEGA-3-ACID ETHYL ESTERS 2 G: 1 CAPSULE, LIQUID FILLED ORAL at 09:02

## 2023-06-27 RX ADMIN — CLOPIDOGREL BISULFATE 75 MG: 75 TABLET ORAL at 09:01

## 2023-06-27 RX ADMIN — ASPIRIN 81 MG: 81 TABLET, COATED ORAL at 09:02

## 2023-06-27 RX ADMIN — ENOXAPARIN SODIUM 40 MG: 100 INJECTION SUBCUTANEOUS at 09:01

## 2023-06-29 ENCOUNTER — TELEPHONE (OUTPATIENT)
Age: 68
End: 2023-06-29

## 2023-06-29 RX ORDER — LAMOTRIGINE 25 MG/1
25 TABLET ORAL 2 TIMES DAILY
Qty: 60 TABLET | Refills: 1 | Status: SHIPPED | OUTPATIENT
Start: 2023-06-29

## 2023-07-25 ENCOUNTER — OFFICE VISIT (OUTPATIENT)
Age: 68
End: 2023-07-25
Payer: MEDICARE

## 2023-07-25 VITALS
WEIGHT: 153 LBS | BODY MASS INDEX: 23.19 KG/M2 | DIASTOLIC BLOOD PRESSURE: 70 MMHG | SYSTOLIC BLOOD PRESSURE: 112 MMHG | HEIGHT: 68 IN | OXYGEN SATURATION: 98 % | HEART RATE: 63 BPM

## 2023-07-25 DIAGNOSIS — G45.9 TIA (TRANSIENT ISCHEMIC ATTACK): Primary | ICD-10-CM

## 2023-07-25 DIAGNOSIS — R40.4 NONSPECIFIC PAROXYSMAL SPELL: ICD-10-CM

## 2023-07-25 PROCEDURE — G8427 DOCREV CUR MEDS BY ELIG CLIN: HCPCS | Performed by: PSYCHIATRY & NEUROLOGY

## 2023-07-25 PROCEDURE — G8420 CALC BMI NORM PARAMETERS: HCPCS | Performed by: PSYCHIATRY & NEUROLOGY

## 2023-07-25 PROCEDURE — 3017F COLORECTAL CA SCREEN DOC REV: CPT | Performed by: PSYCHIATRY & NEUROLOGY

## 2023-07-25 PROCEDURE — 1123F ACP DISCUSS/DSCN MKR DOCD: CPT | Performed by: PSYCHIATRY & NEUROLOGY

## 2023-07-25 PROCEDURE — 99214 OFFICE O/P EST MOD 30 MIN: CPT | Performed by: PSYCHIATRY & NEUROLOGY

## 2023-07-25 PROCEDURE — 3078F DIAST BP <80 MM HG: CPT | Performed by: PSYCHIATRY & NEUROLOGY

## 2023-07-25 PROCEDURE — 1036F TOBACCO NON-USER: CPT | Performed by: PSYCHIATRY & NEUROLOGY

## 2023-07-25 PROCEDURE — 3074F SYST BP LT 130 MM HG: CPT | Performed by: PSYCHIATRY & NEUROLOGY

## 2023-07-25 RX ORDER — MAGNESIUM 200 MG
200 TABLET ORAL 2 TIMES DAILY
COMMUNITY

## 2023-07-25 NOTE — PROGRESS NOTES
Neurology outpatient follow-up visit    Patient name: Sarina Calles      Chief Complaint:  Recurrent spell with numbness. History of present illness: This is 75years old right-handed male. The patient was admitted in the hospital yesterday due to acute onset of left-sided numbness. The onset was 2 days ago. Patient also reported muscle aching over the left lower extremity at the onset. Then the patient noticed the numbness spreading through the entire left side of his body including face arm and leg. The symptoms had been been spontaneously resolved within 24 hours after the onset. The patient has no focal neurological deficits during my assessment. The patient also denies significant headache with this episode. The patient has no history of chronic migraine. Upon admission, there is no significant elevated blood pressure. The patient reported similar episode few times in the past.  Denies history of seizure disorder or CVA. MRI brain showed no evidence of acute infarction. The CTA of head and neck also showed no evidence of significant hemodynamic stenosis. Echocardiogram showed normal EF without intracardiac thrombus. EKG shows normal sinus rhythm. The EEG showed mild to moderate degree of encephalopathy with occasional GRDA. Interval history:  07/25/23: The patient has not had any further spells since discharge from the hospital 1 month ago. However, the patient has not decided to start lamotrigine yet. The patient is concerning about polypharmacy. The patient also is not taking atorvastatin as the patient had significant cramps and myalgia in the past.  The patient is currently on only aspirin.     Past medical history:    Past Medical History:   Diagnosis Date    Abdominal pain     COVID-19     Degenerative disc disease     Hyperlipidemia     Hypertension        Past surgical history:    Past Surgical History:   Procedure Laterality Date    EYE SURGERY Left     cataract

## 2023-09-20 DIAGNOSIS — R40.4 NONSPECIFIC PAROXYSMAL SPELL: ICD-10-CM

## 2023-09-20 DIAGNOSIS — G45.9 TIA (TRANSIENT ISCHEMIC ATTACK): Primary | ICD-10-CM

## 2023-09-21 RX ORDER — LAMOTRIGINE 25 MG/1
25 TABLET ORAL 2 TIMES DAILY
Qty: 60 TABLET | Refills: 1 | Status: SHIPPED | OUTPATIENT
Start: 2023-09-21

## 2023-10-24 ENCOUNTER — OFFICE VISIT (OUTPATIENT)
Age: 68
End: 2023-10-24
Payer: MEDICARE

## 2023-10-24 VITALS
DIASTOLIC BLOOD PRESSURE: 64 MMHG | BODY MASS INDEX: 23.87 KG/M2 | WEIGHT: 157 LBS | OXYGEN SATURATION: 98 % | SYSTOLIC BLOOD PRESSURE: 114 MMHG | HEART RATE: 60 BPM

## 2023-10-24 DIAGNOSIS — R40.4 NONSPECIFIC PAROXYSMAL SPELL: ICD-10-CM

## 2023-10-24 DIAGNOSIS — G45.9 TIA (TRANSIENT ISCHEMIC ATTACK): ICD-10-CM

## 2023-10-24 PROCEDURE — G8420 CALC BMI NORM PARAMETERS: HCPCS | Performed by: PSYCHIATRY & NEUROLOGY

## 2023-10-24 PROCEDURE — G8484 FLU IMMUNIZE NO ADMIN: HCPCS | Performed by: PSYCHIATRY & NEUROLOGY

## 2023-10-24 PROCEDURE — 3074F SYST BP LT 130 MM HG: CPT | Performed by: PSYCHIATRY & NEUROLOGY

## 2023-10-24 PROCEDURE — 1036F TOBACCO NON-USER: CPT | Performed by: PSYCHIATRY & NEUROLOGY

## 2023-10-24 PROCEDURE — 99214 OFFICE O/P EST MOD 30 MIN: CPT | Performed by: PSYCHIATRY & NEUROLOGY

## 2023-10-24 PROCEDURE — G8427 DOCREV CUR MEDS BY ELIG CLIN: HCPCS | Performed by: PSYCHIATRY & NEUROLOGY

## 2023-10-24 PROCEDURE — 3017F COLORECTAL CA SCREEN DOC REV: CPT | Performed by: PSYCHIATRY & NEUROLOGY

## 2023-10-24 PROCEDURE — 1123F ACP DISCUSS/DSCN MKR DOCD: CPT | Performed by: PSYCHIATRY & NEUROLOGY

## 2023-10-24 PROCEDURE — 3078F DIAST BP <80 MM HG: CPT | Performed by: PSYCHIATRY & NEUROLOGY

## 2023-10-24 RX ORDER — LAMOTRIGINE 25 MG/1
50 TABLET ORAL 2 TIMES DAILY
Qty: 120 TABLET | Refills: 3 | Status: SHIPPED | OUTPATIENT
Start: 2023-10-24

## 2023-10-24 NOTE — PROGRESS NOTES
Neurology outpatient follow-up visit    Patient name: Dipika Morataya      Chief Complaint:  Recurrent spell with numbness. History of present illness: This is 75years old right-handed male. The patient was admitted in the hospital yesterday due to acute onset of left-sided numbness. The onset was 2 days ago. Patient also reported muscle aching over the left lower extremity at the onset. Then the patient noticed the numbness spreading through the entire left side of his body including face arm and leg. The symptoms had been been spontaneously resolved within 24 hours after the onset. The patient has no focal neurological deficits during my assessment. The patient also denies significant headache with this episode. The patient has no history of chronic migraine. Upon admission, there is no significant elevated blood pressure. The patient reported similar episode few times in the past.  Denies history of seizure disorder or CVA. MRI brain showed no evidence of acute infarction. The CTA of head and neck also showed no evidence of significant hemodynamic stenosis. Echocardiogram showed normal EF without intracardiac thrombus. EKG shows normal sinus rhythm. The EEG showed mild to moderate degree of encephalopathy with occasional GRDA. Interval history:  07/25/23: The patient has not had any further spells since discharge from the hospital 1 month ago. However, the patient has not decided to start lamotrigine yet. The patient is concerning about polypharmacy. The patient also is not taking atorvastatin as the patient had significant cramps and myalgia in the past.  The patient is currently on only aspirin. 10/24/23: The patient has had only 2 spells since the last visit. The last spell occurred a week ago. The patient denies significant side effect from lamotrigine. The patient reveals that the patient needs to work at night few days a week.   The patient also reports intermittent

## 2024-01-09 ENCOUNTER — OFFICE VISIT (OUTPATIENT)
Age: 69
End: 2024-01-09
Payer: MEDICARE

## 2024-01-09 VITALS
WEIGHT: 158 LBS | HEART RATE: 65 BPM | SYSTOLIC BLOOD PRESSURE: 110 MMHG | HEIGHT: 68 IN | DIASTOLIC BLOOD PRESSURE: 70 MMHG | BODY MASS INDEX: 23.95 KG/M2 | OXYGEN SATURATION: 97 %

## 2024-01-09 DIAGNOSIS — G45.9 TIA (TRANSIENT ISCHEMIC ATTACK): ICD-10-CM

## 2024-01-09 DIAGNOSIS — R40.4 NONSPECIFIC PAROXYSMAL SPELL: ICD-10-CM

## 2024-01-09 PROCEDURE — 1123F ACP DISCUSS/DSCN MKR DOCD: CPT | Performed by: PSYCHIATRY & NEUROLOGY

## 2024-01-09 PROCEDURE — 3078F DIAST BP <80 MM HG: CPT | Performed by: PSYCHIATRY & NEUROLOGY

## 2024-01-09 PROCEDURE — 3074F SYST BP LT 130 MM HG: CPT | Performed by: PSYCHIATRY & NEUROLOGY

## 2024-01-09 PROCEDURE — 99214 OFFICE O/P EST MOD 30 MIN: CPT | Performed by: PSYCHIATRY & NEUROLOGY

## 2024-01-09 RX ORDER — LAMOTRIGINE 100 MG/1
100 TABLET ORAL 2 TIMES DAILY
Qty: 180 TABLET | Refills: 1 | Status: SHIPPED | OUTPATIENT
Start: 2024-01-09

## 2024-01-09 NOTE — PROGRESS NOTES
Neurology outpatient follow-up visit    Patient name: Josiah Bargre      Chief Complaint:  Recurrent spell with numbness.    History of present illness:  This is 67 years old right-handed male.  The patient was admitted in the hospital yesterday due to acute onset of left-sided numbness.  The onset was 2 days ago.  Patient also reported muscle aching over the left lower extremity at the onset.  Then the patient noticed the numbness spreading through the entire left side of his body including face arm and leg.  The symptoms had been been spontaneously resolved within 24 hours after the onset.  The patient has no focal neurological deficits during my assessment.  The patient also denies significant headache with this episode.  The patient has no history of chronic migraine.  Upon admission, there is no significant elevated blood pressure.  The patient reported similar episode few times in the past.  Denies history of seizure disorder or CVA.    MRI brain showed no evidence of acute infarction.  The CTA of head and neck also showed no evidence of significant hemodynamic stenosis.    Echocardiogram showed normal EF without intracardiac thrombus.  EKG shows normal sinus rhythm.  The EEG showed mild to moderate degree of encephalopathy with occasional GRDA.    Interval history:  07/25/23: The patient has not had any further spells since discharge from the hospital 1 month ago.  However, the patient has not decided to start lamotrigine yet.  The patient is concerning about polypharmacy.  The patient also is not taking atorvastatin as the patient had significant cramps and myalgia in the past.  The patient is currently on only aspirin.    10/24/23: The patient has had only 2 spells since the last visit.  The last spell occurred a week ago.  The patient denies significant side effect from lamotrigine.  The patient reveals that the patient needs to work at night few days a week.  The patient also reports intermittent

## 2024-04-16 ENCOUNTER — HOSPITAL ENCOUNTER (OUTPATIENT)
Age: 69
Discharge: HOME | End: 2024-04-16
Payer: MEDICARE

## 2024-04-16 ENCOUNTER — OFFICE VISIT (OUTPATIENT)
Age: 69
End: 2024-04-16
Payer: MEDICARE

## 2024-04-16 ENCOUNTER — HOSPITAL ENCOUNTER (OUTPATIENT)
Dept: GENERAL RADIOLOGY | Age: 69
Discharge: HOME | End: 2024-04-16
Payer: MEDICARE

## 2024-04-16 VITALS
HEART RATE: 57 BPM | HEIGHT: 68 IN | WEIGHT: 157 LBS | DIASTOLIC BLOOD PRESSURE: 68 MMHG | OXYGEN SATURATION: 97 % | SYSTOLIC BLOOD PRESSURE: 114 MMHG | BODY MASS INDEX: 23.79 KG/M2

## 2024-04-16 DIAGNOSIS — G40.209 PARTIAL SYMPTOMATIC EPILEPSY WITH COMPLEX PARTIAL SEIZURES, NOT INTRACTABLE, WITHOUT STATUS EPILEPTICUS (HCC): Primary | ICD-10-CM

## 2024-04-16 DIAGNOSIS — G45.9 TIA (TRANSIENT ISCHEMIC ATTACK): ICD-10-CM

## 2024-04-16 DIAGNOSIS — R40.4 NONSPECIFIC PAROXYSMAL SPELL: ICD-10-CM

## 2024-04-16 DIAGNOSIS — M79.672 LEFT FOOT PAIN: ICD-10-CM

## 2024-04-16 PROCEDURE — 1123F ACP DISCUSS/DSCN MKR DOCD: CPT | Performed by: PSYCHIATRY & NEUROLOGY

## 2024-04-16 PROCEDURE — 73630 X-RAY EXAM OF FOOT: CPT

## 2024-04-16 PROCEDURE — 99214 OFFICE O/P EST MOD 30 MIN: CPT | Performed by: PSYCHIATRY & NEUROLOGY

## 2024-04-16 PROCEDURE — 3074F SYST BP LT 130 MM HG: CPT | Performed by: PSYCHIATRY & NEUROLOGY

## 2024-04-16 PROCEDURE — 3078F DIAST BP <80 MM HG: CPT | Performed by: PSYCHIATRY & NEUROLOGY

## 2024-04-16 RX ORDER — METHYLPREDNISOLONE 4 MG/1
TABLET ORAL
COMMUNITY
Start: 2024-04-10

## 2024-04-16 RX ORDER — LAMOTRIGINE 100 MG/1
100 TABLET ORAL 2 TIMES DAILY
Qty: 180 TABLET | Refills: 1 | Status: SHIPPED | OUTPATIENT
Start: 2024-04-16

## 2024-04-16 NOTE — PATIENT INSTRUCTIONS

## 2024-04-16 NOTE — PROGRESS NOTES
Neurology outpatient follow-up visit    Patient name: Josiah Barger      Chief Complaint:  Recurrent spell with numbness.    History of present illness:  This is 67 years old right-handed male.  The patient was admitted in the hospital yesterday due to acute onset of left-sided numbness.  The onset was 2 days ago.  Patient also reported muscle aching over the left lower extremity at the onset.  Then the patient noticed the numbness spreading through the entire left side of his body including face arm and leg.  The symptoms had been been spontaneously resolved within 24 hours after the onset.  The patient has no focal neurological deficits during my assessment.  The patient also denies significant headache with this episode.  The patient has no history of chronic migraine.  Upon admission, there is no significant elevated blood pressure.  The patient reported similar episode few times in the past.  Denies history of seizure disorder or CVA.    MRI brain showed no evidence of acute infarction.  The CTA of head and neck also showed no evidence of significant hemodynamic stenosis.    Echocardiogram showed normal EF without intracardiac thrombus.  EKG shows normal sinus rhythm.  The EEG showed mild to moderate degree of encephalopathy with occasional GRDA.    Interval history:  07/25/23: The patient has not had any further spells since discharge from the hospital 1 month ago.  However, the patient has not decided to start lamotrigine yet.  The patient is concerning about polypharmacy.  The patient also is not taking atorvastatin as the patient had significant cramps and myalgia in the past.  The patient is currently on only aspirin.    10/24/23: The patient has had only 2 spells since the last visit.  The last spell occurred a week ago.  The patient denies significant side effect from lamotrigine.  The patient reveals that the patient needs to work at night few days a week.  The patient also reports intermittent

## 2024-05-20 ENCOUNTER — APPOINTMENT (OUTPATIENT)
Dept: GENERAL RADIOLOGY | Age: 69
DRG: 101 | End: 2024-05-20
Payer: MEDICARE

## 2024-05-20 ENCOUNTER — APPOINTMENT (OUTPATIENT)
Dept: CT IMAGING | Age: 69
DRG: 101 | End: 2024-05-20
Payer: MEDICARE

## 2024-05-20 ENCOUNTER — TELEPHONE (OUTPATIENT)
Age: 69
End: 2024-05-20

## 2024-05-20 ENCOUNTER — HOSPITAL ENCOUNTER (INPATIENT)
Age: 69
LOS: 2 days | Discharge: HOME | DRG: 101 | End: 2024-05-22
Attending: STUDENT IN AN ORGANIZED HEALTH CARE EDUCATION/TRAINING PROGRAM | Admitting: INTERNAL MEDICINE
Payer: MEDICARE

## 2024-05-20 DIAGNOSIS — M79.662 PAIN OF LEFT CALF: ICD-10-CM

## 2024-05-20 DIAGNOSIS — G45.9 TIA (TRANSIENT ISCHEMIC ATTACK): Primary | ICD-10-CM

## 2024-05-20 LAB
ALBUMIN SERPL-MCNC: 4.3 G/DL (ref 3.4–5)
ALBUMIN/GLOB SERPL: 1.7 {RATIO} (ref 1.1–2.2)
ALP SERPL-CCNC: 59 U/L (ref 40–129)
ALT SERPL-CCNC: 12 U/L (ref 10–40)
ANION GAP SERPL CALCULATED.3IONS-SCNC: 11 MMOL/L (ref 3–16)
AST SERPL-CCNC: 12 U/L (ref 15–37)
BASOPHILS # BLD: 0.1 K/UL (ref 0–0.2)
BASOPHILS NFR BLD: 1 %
BILIRUB SERPL-MCNC: 0.3 MG/DL (ref 0–1)
BUN SERPL-MCNC: 19 MG/DL (ref 7–20)
CALCIUM SERPL-MCNC: 9.3 MG/DL (ref 8.3–10.6)
CHLORIDE SERPL-SCNC: 103 MMOL/L (ref 99–110)
CO2 SERPL-SCNC: 25 MMOL/L (ref 21–32)
CREAT SERPL-MCNC: 0.9 MG/DL (ref 0.8–1.3)
DEPRECATED RDW RBC AUTO: 13.4 % (ref 12.4–15.4)
EKG ATRIAL RATE: 70 BPM
EKG DIAGNOSIS: NORMAL
EKG P AXIS: 45 DEGREES
EKG P-R INTERVAL: 164 MS
EKG Q-T INTERVAL: 368 MS
EKG QRS DURATION: 94 MS
EKG QTC CALCULATION (BAZETT): 397 MS
EKG R AXIS: -42 DEGREES
EKG T AXIS: 25 DEGREES
EKG VENTRICULAR RATE: 70 BPM
EOSINOPHIL # BLD: 0.2 K/UL (ref 0–0.6)
EOSINOPHIL NFR BLD: 3 %
GFR SERPLBLD CREATININE-BSD FMLA CKD-EPI: >90 ML/MIN/{1.73_M2}
GLUCOSE SERPL-MCNC: 91 MG/DL (ref 70–99)
HCT VFR BLD AUTO: 42.3 % (ref 40.5–52.5)
HGB BLD-MCNC: 14.4 G/DL (ref 13.5–17.5)
INR PPP: 0.96 (ref 0.85–1.15)
LYMPHOCYTES # BLD: 2.3 K/UL (ref 1–5.1)
LYMPHOCYTES NFR BLD: 35.2 %
MCH RBC QN AUTO: 32.2 PG (ref 26–34)
MCHC RBC AUTO-ENTMCNC: 34 G/DL (ref 31–36)
MCV RBC AUTO: 94.8 FL (ref 80–100)
MONOCYTES # BLD: 0.4 K/UL (ref 0–1.3)
MONOCYTES NFR BLD: 6.5 %
NEUTROPHILS # BLD: 3.6 K/UL (ref 1.7–7.7)
NEUTROPHILS NFR BLD: 54.3 %
PLATELET # BLD AUTO: 262 K/UL (ref 135–450)
PMV BLD AUTO: 7 FL (ref 5–10.5)
POTASSIUM SERPL-SCNC: 3.9 MMOL/L (ref 3.5–5.1)
PROT SERPL-MCNC: 6.8 G/DL (ref 6.4–8.2)
PROTHROMBIN TIME: 12.9 SEC (ref 11.9–14.9)
RBC # BLD AUTO: 4.47 M/UL (ref 4.2–5.9)
SARS-COV-2 RDRP RESP QL NAA+PROBE: NOT DETECTED
SODIUM SERPL-SCNC: 139 MMOL/L (ref 136–145)
TROPONIN, HIGH SENSITIVITY: 8 NG/L (ref 0–22)
WBC # BLD AUTO: 6.6 K/UL (ref 4–11)

## 2024-05-20 PROCEDURE — 6370000000 HC RX 637 (ALT 250 FOR IP): Performed by: STUDENT IN AN ORGANIZED HEALTH CARE EDUCATION/TRAINING PROGRAM

## 2024-05-20 PROCEDURE — 85610 PROTHROMBIN TIME: CPT

## 2024-05-20 PROCEDURE — 87635 SARS-COV-2 COVID-19 AMP PRB: CPT

## 2024-05-20 PROCEDURE — 84484 ASSAY OF TROPONIN QUANT: CPT

## 2024-05-20 PROCEDURE — 6360000004 HC RX CONTRAST MEDICATION: Performed by: STUDENT IN AN ORGANIZED HEALTH CARE EDUCATION/TRAINING PROGRAM

## 2024-05-20 PROCEDURE — 70450 CT HEAD/BRAIN W/O DYE: CPT

## 2024-05-20 PROCEDURE — 93005 ELECTROCARDIOGRAM TRACING: CPT | Performed by: STUDENT IN AN ORGANIZED HEALTH CARE EDUCATION/TRAINING PROGRAM

## 2024-05-20 PROCEDURE — 70498 CT ANGIOGRAPHY NECK: CPT

## 2024-05-20 PROCEDURE — 99285 EMERGENCY DEPT VISIT HI MDM: CPT

## 2024-05-20 PROCEDURE — 1200000000 HC SEMI PRIVATE

## 2024-05-20 PROCEDURE — 71045 X-RAY EXAM CHEST 1 VIEW: CPT

## 2024-05-20 PROCEDURE — 93010 ELECTROCARDIOGRAM REPORT: CPT | Performed by: INTERNAL MEDICINE

## 2024-05-20 PROCEDURE — 80053 COMPREHEN METABOLIC PANEL: CPT

## 2024-05-20 PROCEDURE — 36415 COLL VENOUS BLD VENIPUNCTURE: CPT

## 2024-05-20 PROCEDURE — 85025 COMPLETE CBC W/AUTO DIFF WBC: CPT

## 2024-05-20 RX ORDER — ASPIRIN 81 MG/1
324 TABLET, CHEWABLE ORAL ONCE
Status: COMPLETED | OUTPATIENT
Start: 2024-05-20 | End: 2024-05-20

## 2024-05-20 RX ADMIN — ASPIRIN 324 MG: 81 TABLET, CHEWABLE ORAL at 21:55

## 2024-05-20 RX ADMIN — IOPAMIDOL 75 ML: 755 INJECTION, SOLUTION INTRAVENOUS at 18:36

## 2024-05-20 ASSESSMENT — PAIN - FUNCTIONAL ASSESSMENT: PAIN_FUNCTIONAL_ASSESSMENT: 0-10

## 2024-05-20 ASSESSMENT — PAIN SCALES - GENERAL: PAINLEVEL_OUTOF10: 0

## 2024-05-20 NOTE — ED PROVIDER NOTES
[facility] ED      CHIEF COMPLAINT  Numbness (Pt to ED with complaint of having some numbness to the left side of his face, left arm, and left leg that started yesterday around 0800. Pt states he called his neurologist today and told him about the symptoms and he told patient to come in. Pt states he does not have the numbness right now. Pt does have hx of TIA. Pt also endorsing some chest pain that started yesterday, but no pain now. Pt also states difficulty swallowing sometimes that has been going on for a few weeks. )       HISTORY OF PRESENT ILLNESS  Josiah Barger is a 68 y.o. male  who presents to the ED complaining of numbness to the left face, arm and leg that started yesterday morning around 8 AM and lasted throughout the day.  Indicates that his left face was numb followed by his left arm and just the posterior aspect of his left knee.  He also endorses 1 hour of chest pain yesterday that started around 12:00.  Pain was not exertional, notes no shortness of breath or nausea associated with it.  States that when he woke up this morning he had no numbness and tingling.  Did have a mild headache that improved after couple coffee.  Called his neurologist advised to come in for evaluation due to the symptoms.    No other complaints, modifying factors or associated symptoms.     I have reviewed the following from the nursing documentation.    Past Medical History:   Diagnosis Date    Abdominal pain     COVID-19     Degenerative disc disease     Hyperlipidemia     Hypertension      Past Surgical History:   Procedure Laterality Date    EYE SURGERY Left     cataract    HERNIA REPAIR       Family History   Problem Relation Age of Onset    Cancer Mother         breast    Cancer Maternal Grandmother         stomach tumor    Cancer Paternal Grandfather         leukemia     Social History     Socioeconomic History    Marital status:      Spouse name: Not on file    Number of children: Not on

## 2024-05-20 NOTE — TELEPHONE ENCOUNTER
Spoke with spouse whom was informed on Dr Phelps's response. Pt spouse was hesitant on taking patient to ER states she will have to talk pt into this. She does not feel like this could be a stroke

## 2024-05-20 NOTE — TELEPHONE ENCOUNTER
Spouse called stating that over the weekend she feels patient has had worsening sxs of numbness and tingling in his left side of face that's close to his mouth up to eye to nose. Patient also complains of Tingling on back side of left leg behind knee that radiates to his feet. Patient did fall recently due to this. Patient decided to double the dose of Lamictal (was taking 100 BID he increased to 100mg TID) to see if that would help however spouse informed him not to do that until getting approved by provider.  He's hearing the sound of birds in his ears as well ongoing for 1 month.

## 2024-05-21 PROBLEM — M79.662 PAIN OF LEFT CALF: Status: ACTIVE | Noted: 2024-05-21

## 2024-05-21 PROBLEM — G40.209 PARTIAL SYMPTOMATIC EPILEPSY WITH COMPLEX PARTIAL SEIZURES, NOT INTRACTABLE, WITHOUT STATUS EPILEPTICUS (HCC): Status: ACTIVE | Noted: 2024-05-21

## 2024-05-21 LAB
CHOLEST SERPL-MCNC: 217 MG/DL (ref 0–199)
DEPRECATED RDW RBC AUTO: 13.6 % (ref 12.4–15.4)
HCT VFR BLD AUTO: 40.2 % (ref 40.5–52.5)
HDLC SERPL-MCNC: 53 MG/DL (ref 40–60)
HGB BLD-MCNC: 13.9 G/DL (ref 13.5–17.5)
LDLC SERPL CALC-MCNC: 141 MG/DL
MCH RBC QN AUTO: 32.7 PG (ref 26–34)
MCHC RBC AUTO-ENTMCNC: 34.6 G/DL (ref 31–36)
MCV RBC AUTO: 94.6 FL (ref 80–100)
PLATELET # BLD AUTO: 243 K/UL (ref 135–450)
PMV BLD AUTO: 6.8 FL (ref 5–10.5)
RBC # BLD AUTO: 4.25 M/UL (ref 4.2–5.9)
TRIGL SERPL-MCNC: 116 MG/DL (ref 0–150)
VLDLC SERPL CALC-MCNC: 23 MG/DL
WBC # BLD AUTO: 6.6 K/UL (ref 4–11)

## 2024-05-21 PROCEDURE — 97530 THERAPEUTIC ACTIVITIES: CPT

## 2024-05-21 PROCEDURE — 80061 LIPID PANEL: CPT

## 2024-05-21 PROCEDURE — 97161 PT EVAL LOW COMPLEX 20 MIN: CPT

## 2024-05-21 PROCEDURE — 2580000003 HC RX 258: Performed by: INTERNAL MEDICINE

## 2024-05-21 PROCEDURE — 36415 COLL VENOUS BLD VENIPUNCTURE: CPT

## 2024-05-21 PROCEDURE — 6360000002 HC RX W HCPCS: Performed by: INTERNAL MEDICINE

## 2024-05-21 PROCEDURE — 1200000000 HC SEMI PRIVATE

## 2024-05-21 PROCEDURE — 83036 HEMOGLOBIN GLYCOSYLATED A1C: CPT

## 2024-05-21 PROCEDURE — 99232 SBSQ HOSP IP/OBS MODERATE 35: CPT | Performed by: NURSE PRACTITIONER

## 2024-05-21 PROCEDURE — 95816 EEG AWAKE AND DROWSY: CPT | Performed by: PSYCHIATRY & NEUROLOGY

## 2024-05-21 PROCEDURE — 85027 COMPLETE CBC AUTOMATED: CPT

## 2024-05-21 PROCEDURE — 99223 1ST HOSP IP/OBS HIGH 75: CPT | Performed by: PSYCHIATRY & NEUROLOGY

## 2024-05-21 PROCEDURE — 6370000000 HC RX 637 (ALT 250 FOR IP): Performed by: PSYCHIATRY & NEUROLOGY

## 2024-05-21 PROCEDURE — 95816 EEG AWAKE AND DROWSY: CPT

## 2024-05-21 PROCEDURE — 6370000000 HC RX 637 (ALT 250 FOR IP): Performed by: INTERNAL MEDICINE

## 2024-05-21 PROCEDURE — 97165 OT EVAL LOW COMPLEX 30 MIN: CPT

## 2024-05-21 PROCEDURE — 80175 DRUG SCREEN QUAN LAMOTRIGINE: CPT

## 2024-05-21 RX ORDER — POLYETHYLENE GLYCOL 3350 17 G/17G
17 POWDER, FOR SOLUTION ORAL DAILY PRN
Status: DISCONTINUED | OUTPATIENT
Start: 2024-05-21 | End: 2024-05-22 | Stop reason: HOSPADM

## 2024-05-21 RX ORDER — ASPIRIN 300 MG/1
300 SUPPOSITORY RECTAL DAILY
Status: DISCONTINUED | OUTPATIENT
Start: 2024-05-21 | End: 2024-05-22 | Stop reason: HOSPADM

## 2024-05-21 RX ORDER — ONDANSETRON 4 MG/1
4 TABLET, ORALLY DISINTEGRATING ORAL EVERY 8 HOURS PRN
Status: DISCONTINUED | OUTPATIENT
Start: 2024-05-21 | End: 2024-05-22 | Stop reason: HOSPADM

## 2024-05-21 RX ORDER — SODIUM CHLORIDE 0.9 % (FLUSH) 0.9 %
5-40 SYRINGE (ML) INJECTION PRN
Status: DISCONTINUED | OUTPATIENT
Start: 2024-05-21 | End: 2024-05-22 | Stop reason: HOSPADM

## 2024-05-21 RX ORDER — LAMOTRIGINE 100 MG/1
100 TABLET ORAL 3 TIMES DAILY
Status: DISCONTINUED | OUTPATIENT
Start: 2024-05-21 | End: 2024-05-22 | Stop reason: HOSPADM

## 2024-05-21 RX ORDER — SODIUM CHLORIDE 9 MG/ML
INJECTION, SOLUTION INTRAVENOUS PRN
Status: DISCONTINUED | OUTPATIENT
Start: 2024-05-21 | End: 2024-05-22 | Stop reason: HOSPADM

## 2024-05-21 RX ORDER — SODIUM CHLORIDE 0.9 % (FLUSH) 0.9 %
5-40 SYRINGE (ML) INJECTION EVERY 12 HOURS SCHEDULED
Status: DISCONTINUED | OUTPATIENT
Start: 2024-05-21 | End: 2024-05-22 | Stop reason: HOSPADM

## 2024-05-21 RX ORDER — ENOXAPARIN SODIUM 100 MG/ML
40 INJECTION SUBCUTANEOUS DAILY
Status: DISCONTINUED | OUTPATIENT
Start: 2024-05-21 | End: 2024-05-22 | Stop reason: HOSPADM

## 2024-05-21 RX ORDER — ONDANSETRON 2 MG/ML
4 INJECTION INTRAMUSCULAR; INTRAVENOUS EVERY 6 HOURS PRN
Status: DISCONTINUED | OUTPATIENT
Start: 2024-05-21 | End: 2024-05-22 | Stop reason: HOSPADM

## 2024-05-21 RX ORDER — LAMOTRIGINE 100 MG/1
100 TABLET ORAL 2 TIMES DAILY
Status: DISCONTINUED | OUTPATIENT
Start: 2024-05-21 | End: 2024-05-21

## 2024-05-21 RX ORDER — ASPIRIN 81 MG/1
81 TABLET, CHEWABLE ORAL DAILY
Status: DISCONTINUED | OUTPATIENT
Start: 2024-05-21 | End: 2024-05-22 | Stop reason: HOSPADM

## 2024-05-21 RX ORDER — ATORVASTATIN CALCIUM 40 MG/1
80 TABLET, FILM COATED ORAL NIGHTLY
Status: DISCONTINUED | OUTPATIENT
Start: 2024-05-21 | End: 2024-05-22 | Stop reason: HOSPADM

## 2024-05-21 RX ADMIN — LAMOTRIGINE 100 MG: 100 TABLET ORAL at 21:26

## 2024-05-21 RX ADMIN — Medication 10 ML: at 21:30

## 2024-05-21 RX ADMIN — Medication 10 ML: at 08:42

## 2024-05-21 RX ADMIN — ATORVASTATIN CALCIUM 80 MG: 40 TABLET, FILM COATED ORAL at 21:26

## 2024-05-21 RX ADMIN — LAMOTRIGINE 100 MG: 100 TABLET ORAL at 08:42

## 2024-05-21 RX ADMIN — ASPIRIN 81 MG: 81 TABLET, CHEWABLE ORAL at 08:42

## 2024-05-21 RX ADMIN — ENOXAPARIN SODIUM 40 MG: 100 INJECTION SUBCUTANEOUS at 08:42

## 2024-05-21 ASSESSMENT — PAIN DESCRIPTION - LOCATION: LOCATION: KNEE

## 2024-05-21 ASSESSMENT — PAIN DESCRIPTION - FREQUENCY: FREQUENCY: INTERMITTENT

## 2024-05-21 ASSESSMENT — PAIN DESCRIPTION - ORIENTATION: ORIENTATION: LEFT;POSTERIOR

## 2024-05-21 ASSESSMENT — PAIN DESCRIPTION - DIRECTION: RADIATING_TOWARDS: DENIES

## 2024-05-21 ASSESSMENT — PAIN SCALES - GENERAL
PAINLEVEL_OUTOF10: 3
PAINLEVEL_OUTOF10: 0

## 2024-05-21 ASSESSMENT — PAIN DESCRIPTION - DESCRIPTORS: DESCRIPTORS: DISCOMFORT

## 2024-05-21 NOTE — ACP (ADVANCE CARE PLANNING)
Advance Care Planning     General Advance Care Planning (ACP) Conversation    Date of Conversation: 5/21/2024  Conducted with: Patient with Decision Making Capacity  Other persons present: None    Healthcare Decision Maker: No healthcare decision makers have been documented.  Click here to complete HealthCare Decision Makers including selection of the Healthcare Decision Maker Relationship (ie \"Primary\")   Today we documented Decision Maker(s) consistent with Legal Next of Kin hierarchy.    Content/Action Overview:  DECLINED ACP Conversation - will revisit periodically  Reviewed DNR/DNI and patient elects Full Code (Attempt Resuscitation)        Length of Voluntary ACP Conversation in minutes:  <16 minutes (Non-Billable)    Phoebe Alves

## 2024-05-21 NOTE — FLOWSHEET NOTE
05/21/24 0830   Vital Signs   Temp 97.1 °F (36.2 °C)   Temp Source Oral   Pulse 65   Heart Rate Source Monitor   Respirations 16   /70   MAP (Calculated) 82   BP Location Left upper arm   Patient Position High fowlers   Oxygen Therapy   SpO2 94 %   O2 Device None (Room air)     AM assessment completed, see flow sheet. Pt is alert and oriented, somewhat difficult to get straight answers from. Vital signs are WNL. Respirations are even & easy. Pt has a history of seizures - follows Dr. Lenin MARTINEZ for seizure management, no hx stroke. Pt states that his symptoms have improved since admission but the numbness and tingling can still be felt somewhat on his left face. Pt states that in the past this numbness/tingling precipitated a seizure but only lasted just before and just after the seizure. No complaints voiced. Pt denies needs at this time. SR up x 2, and bed in low position. Call light is within reach.

## 2024-05-21 NOTE — CONSULTS
Neurology consultation note    Patient name: Josiah Barger      Chief Complaint:  Another episode of left-sided numbness.    History of present illness:  This is a 68 years old right-handed male.  The patient was brought to the hospital overnight after the patient developed another episode of left-sided numbness on Sunday.  The patient has been followed by neurology for recurrent spell with left-sided numbness.  The patient was started lamotrigine for possible focal seizure.  In the past, the patient had multiple stroke workups without positive finding.  The patient always was diagnosed with recurrent TIA.  The patient reported significant benefit from lamotrigine however, the patient remains to have a spell few times a month.  This time, the spell lasted longer than usual.  So neurology recommended patient to go to the ER for further evaluation.  Upon admission, the patient did not have significant hyperplasia.  The CTA of head and neck showed no significant hemodynamic stenosis.  CT brain was also negative.  The patient tried double dose of lamotrigine prior to this admission.  The patient reported the spell was subsided afterwards.  The patient denies significant side effect from the medicine.    The patient also complained headache after the spell.    Past medical history:    Past Medical History:   Diagnosis Date    Abdominal pain     COVID-19     Degenerative disc disease     Hyperlipidemia     Hypertension        Past surgical history:    Past Surgical History:   Procedure Laterality Date    EYE SURGERY Left     cataract    HERNIA REPAIR          Medication:    Current Facility-Administered Medications   Medication Dose Route Frequency Provider Last Rate Last Admin    lamoTRIgine (LAMICTAL) tablet 100 mg  100 mg Oral BID Will Velasquez MD   100 mg at 05/21/24 0842    sodium chloride flush 0.9 % injection 5-40 mL  5-40 mL IntraVENous 2 times per day Will Velasquez MD   10 mL at 05/21/24 0842    sodium

## 2024-05-21 NOTE — PROCEDURES
PROCEDURE NOTE  Date: 5/21/2024   Name: Josiah Barger  YOB: 1955    Procedures      INTERPRETATION:  This 25-minute, computer-assisted video EEG recording is mildly abnormal.  It showed mild degree of generalized slowing background activity.  No potentially epileptiform activity was present during the recording.      The EEG findings were consistent with mild degree of generalized non-specific cerebral dysfunction.    CLASSIFICATION:  Dysrhythmia grade 1, generalized.  Sleep - unsuccessful.  EKG channel.    DESCRIPTION:    BACKGROUND:  The awake recording revealed 9 Hz alpha activity over the posterior head region.  There were increased 4 to 7 Hz theta activity into the EEG background.  Given the extensive study, the patient still did not sleep.  The EEG showed normal V waves during drowsiness.  There was no significant change on the EEG background with photic stimulation.  Hyperventilation was omitted due to old age.      INTERICTAL DISCHARGES: None    CLINICAL EVENTS:  None    The EKG channel was unremarkable.

## 2024-05-21 NOTE — H&P
consult    #Hypertension  #Hyperlipidemia  Resume home medication    DVT prophylaxis: Lovenox    Diet: REGULAR  Code Status: full    Consults:  IP CONSULT TO NEUROLOGY    Disposition:  Admit to Inpatient   ELOS:  Greater than two midnights due to medical therapy     Please note that portions of this note were completed with a voice recognition program.  Efforts were made to edit the dictations but occasionally words are mis-transcribed.)     Will Velasquez MD

## 2024-05-21 NOTE — CARE COORDINATION
Case Management Assessment  Initial Evaluation    Date/Time of Evaluation: 5/21/2024 9:56 AM  Assessment Completed by: Phoebe Alves    If patient is discharged prior to next notation, then this note serves as note for discharge by case management.    Patient Name: Josiah Barger                   YOB: 1955  Diagnosis: TIA (transient ischemic attack) [G45.9]                   Date / Time: 5/20/2024  5:10 PM    Patient Admission Status: Inpatient   Readmission Risk (Low < 19, Mod (19-27), High > 27): Readmission Risk Score: 6    Current PCP: Alvarez Jones MD  PCP verified by CM? Yes ([facility] [place])    Chart Reviewed: Yes      History Provided by: Patient  Patient Orientation: Alert and Oriented    Patient Cognition: Alert    Hospitalization in the last 30 days (Readmission):  No    If yes, Readmission Assessment in CM Navigator will be completed.    Advance Directives:      Code Status: Full Code   Patient's Primary Decision Maker is: Patient Declined (Legal Next of Kin Remains as Decision Maker)      Discharge Planning:    Patient lives with: Spouse/Significant Other Type of Home: House  Primary Care Giver: Self  Patient Support Systems include: Spouse/Significant Other   Current Financial resources: Medicare  Current community resources: None  Current services prior to admission: None            Current DME:              Type of Home Care services:  None    ADLS  Prior functional level: Independent in ADLs/IADLs  Current functional level: Independent in ADLs/IADLs    PT AM-PAC:   /24  OT AM-PAC:   /24    Family can provide assistance at DC: Yes  Would you like Case Management to discuss the discharge plan with any other family members/significant others, and if so, who? No  Plans to Return to Present Housing: Yes  Other Identified Issues/Barriers to RETURNING to current housing: none  Potential Assistance needed at discharge: N/A            Potential DME:    Patient expects to

## 2024-05-22 ENCOUNTER — HOSPITAL ENCOUNTER (INPATIENT)
Age: 69
Discharge: HOME | DRG: 101 | End: 2024-05-24
Payer: MEDICARE

## 2024-05-22 VITALS
HEART RATE: 68 BPM | BODY MASS INDEX: 23.28 KG/M2 | SYSTOLIC BLOOD PRESSURE: 114 MMHG | OXYGEN SATURATION: 94 % | HEIGHT: 69 IN | TEMPERATURE: 98 F | RESPIRATION RATE: 16 BRPM | DIASTOLIC BLOOD PRESSURE: 68 MMHG | WEIGHT: 157.19 LBS

## 2024-05-22 LAB
ANION GAP SERPL CALCULATED.3IONS-SCNC: 9 MMOL/L (ref 3–16)
BASOPHILS # BLD: 0 K/UL (ref 0–0.2)
BASOPHILS NFR BLD: 0.7 %
BUN SERPL-MCNC: 14 MG/DL (ref 7–20)
CALCIUM SERPL-MCNC: 9.3 MG/DL (ref 8.3–10.6)
CHLORIDE SERPL-SCNC: 106 MMOL/L (ref 99–110)
CO2 SERPL-SCNC: 28 MMOL/L (ref 21–32)
CREAT SERPL-MCNC: 1 MG/DL (ref 0.8–1.3)
DEPRECATED RDW RBC AUTO: 13.4 % (ref 12.4–15.4)
EOSINOPHIL # BLD: 0.2 K/UL (ref 0–0.6)
EOSINOPHIL NFR BLD: 3.7 %
EST. AVERAGE GLUCOSE BLD GHB EST-MCNC: 102.5 MG/DL
GFR SERPLBLD CREATININE-BSD FMLA CKD-EPI: 81 ML/MIN/{1.73_M2}
GLUCOSE SERPL-MCNC: 93 MG/DL (ref 70–99)
HBA1C MFR BLD: 5.2 %
HCT VFR BLD AUTO: 41.4 % (ref 40.5–52.5)
HGB BLD-MCNC: 14.3 G/DL (ref 13.5–17.5)
LYMPHOCYTES # BLD: 2.3 K/UL (ref 1–5.1)
LYMPHOCYTES NFR BLD: 34.9 %
MCH RBC QN AUTO: 33 PG (ref 26–34)
MCHC RBC AUTO-ENTMCNC: 34.4 G/DL (ref 31–36)
MCV RBC AUTO: 95.7 FL (ref 80–100)
MONOCYTES # BLD: 0.5 K/UL (ref 0–1.3)
MONOCYTES NFR BLD: 7.6 %
NEUTROPHILS # BLD: 3.5 K/UL (ref 1.7–7.7)
NEUTROPHILS NFR BLD: 53.1 %
PLATELET # BLD AUTO: 260 K/UL (ref 135–450)
PMV BLD AUTO: 6.8 FL (ref 5–10.5)
POTASSIUM SERPL-SCNC: 4.1 MMOL/L (ref 3.5–5.1)
RBC # BLD AUTO: 4.32 M/UL (ref 4.2–5.9)
SODIUM SERPL-SCNC: 143 MMOL/L (ref 136–145)
WBC # BLD AUTO: 6.5 K/UL (ref 4–11)

## 2024-05-22 PROCEDURE — 36415 COLL VENOUS BLD VENIPUNCTURE: CPT

## 2024-05-22 PROCEDURE — 2580000003 HC RX 258: Performed by: INTERNAL MEDICINE

## 2024-05-22 PROCEDURE — 85025 COMPLETE CBC W/AUTO DIFF WBC: CPT

## 2024-05-22 PROCEDURE — 6370000000 HC RX 637 (ALT 250 FOR IP): Performed by: INTERNAL MEDICINE

## 2024-05-22 PROCEDURE — 80048 BASIC METABOLIC PNL TOTAL CA: CPT

## 2024-05-22 PROCEDURE — 6360000002 HC RX W HCPCS: Performed by: INTERNAL MEDICINE

## 2024-05-22 PROCEDURE — 99233 SBSQ HOSP IP/OBS HIGH 50: CPT | Performed by: PSYCHIATRY & NEUROLOGY

## 2024-05-22 PROCEDURE — 6370000000 HC RX 637 (ALT 250 FOR IP): Performed by: PSYCHIATRY & NEUROLOGY

## 2024-05-22 PROCEDURE — 93971 EXTREMITY STUDY: CPT

## 2024-05-22 RX ORDER — ATORVASTATIN CALCIUM 40 MG/1
40 TABLET, FILM COATED ORAL NIGHTLY
Qty: 30 TABLET | Refills: 1 | Status: SHIPPED | OUTPATIENT
Start: 2024-05-22

## 2024-05-22 RX ORDER — LAMOTRIGINE 100 MG/1
100 TABLET ORAL 3 TIMES DAILY
Qty: 90 TABLET | Refills: 1 | Status: SHIPPED | OUTPATIENT
Start: 2024-05-22

## 2024-05-22 RX ORDER — ASPIRIN 81 MG/1
81 TABLET, CHEWABLE ORAL DAILY
Qty: 30 TABLET | Refills: 3 | Status: SHIPPED | OUTPATIENT
Start: 2024-05-23 | End: 2024-05-22 | Stop reason: HOSPADM

## 2024-05-22 RX ORDER — ASPIRIN 81 MG/1
81 TABLET ORAL DAILY
COMMUNITY
Start: 2024-05-22

## 2024-05-22 RX ADMIN — ENOXAPARIN SODIUM 40 MG: 100 INJECTION SUBCUTANEOUS at 10:57

## 2024-05-22 RX ADMIN — ASPIRIN 81 MG: 81 TABLET, CHEWABLE ORAL at 10:57

## 2024-05-22 RX ADMIN — LAMOTRIGINE 100 MG: 100 TABLET ORAL at 16:13

## 2024-05-22 RX ADMIN — Medication 10 ML: at 11:00

## 2024-05-22 RX ADMIN — LAMOTRIGINE 100 MG: 100 TABLET ORAL at 10:57

## 2024-05-22 NOTE — CARE COORDINATION
DISCHARGE ORDER  Date/Time 2024 2:03 PM  Completed by: Phoebe Alves, Case Management    Patient Name: Josiah Barger      : 1955  Admitting Diagnosis: TIA (transient ischemic attack) [G45.9]      Admit order Date and Status: 2024  (verify MD's last order for status of admission)           If applicable PT/OT recommendation at Discharge: NA    Confirmed discharge plan: Yes  with whom__Josiah____________  If pt confirmed DC plan does family need to be contacted by CM No     Discharge Plan: Reviewed chart, noted DC order, met with pt at bedside.  Role of discharge planner explained and patient verbalized understanding. Pt to be DC home today, wife providing transportation. Denies and HC/DME at DC.       Date of Last IMM Given: 2024    Has Home O2 in place on admit:  No    Pt is being d/c'd to home today. Pt's O2 sats are 94% on RA.      Discharge timeout done with PT, CM, Negro RN. All discharge needs and concerns addressed.

## 2024-05-22 NOTE — DISCHARGE INSTR - DIET

## 2024-05-22 NOTE — DISCHARGE INSTR - COC
ADLs:654014323}  Dressing  {CHP DME ADLs:368021619}  Toileting  {CHP DME ADLs:692713086}  Feeding  {CHP DME ADLs:395963652}  Med Admin  {P DME ADLs:819418518}  Med Delivery   { LORIE MED Delivery:326085856}    Wound Care Documentation and Therapy:        Elimination:  Continence:   Bowel: {YES / NO:}  Bladder: {YES / NO:}  Urinary Catheter: {Urinary Catheter:470015651}   Colostomy/Ileostomy/Ileal Conduit: {YES / NO:}       Date of Last BM: ***  No intake or output data in the 24 hours ending 24 1603  No intake/output data recorded.    Safety Concerns:     { LORIE Safety Concerns:264601641}    Impairments/Disabilities:      { LORIE Impairments/Disabilities:304456955}    Nutrition Therapy:  Current Nutrition Therapy:   {[facility] Diet List:286530474}    Routes of Feeding: {[facility] DME Other Feedings:468705156}  Liquids: {Slp liquid thickness:51959}  Daily Fluid Restriction: {P DME Yes amt example:064690852}  Last Modified Barium Swallow with Video (Video Swallowing Test): {Done Not Done Date:}    Treatments at the Time of Hospital Discharge:   Respiratory Treatments: ***  Oxygen Therapy:  {Therapy; copd oxygen:34601}  Ventilator:    {[facility] Vent List:628262079}    Rehab Therapies: {THERAPEUTIC INTERVENTION:4703793573}  Weight Bearing Status/Restrictions: {[facility] Weight Bearin}  Other Medical Equipment (for information only, NOT a DME order):  {EQUIPMENT:213695347}  Other Treatments: ***    Patient's personal belongings (please select all that are sent with patient):  {[facility] DME Belongings:332372712}    RN SIGNATURE:  {Esignature:507508835}    CASE MANAGEMENT/SOCIAL WORK SECTION    Inpatient Status Date: ***    Readmission Risk Assessment Score:  Readmission Risk              Risk of Unplanned Readmission:  9           Discharging to Facility/ Agency   Name:   Address:  Phone:  Fax:    Dialysis Facility (if applicable)   Name:  Address:  Dialysis Schedule:  Phone:  Fax:    Case

## 2024-05-22 NOTE — DISCHARGE SUMMARY
Name:  Josiah Barger  Room:  0230/0230-01  MRN:    [id number]    Discharge Summary      This discharge summary is in conjunction with a complete physical exam done on the day of discharge.    Discharging Physician: Dr. Bradshaw      Admit: 5/20/2024  Discharge:  522/2024    HPI taken from admission H&P:    Josiah Barger is a 68 y.o. male with a PMH of hypertension, hyperlipidemia, DDD who presented to ED with complaint of numbness.     Patient also endorses history of left facial numbness for which she has followed up with the neurologist that he initiated him on Lamictal a few months ago.     Patient presents to the ED with complaints of numbness on the left side of his face arm and leg that started yesterday around 0800.  Patient endorses that he called his neurologist today with symptoms and was advised to present to the ED for further management.  Of note numbness has resolved.  Endorses he has a history of TIA.  He states that he also had substernal chest pain for 1 hour.  Not exertional not associate with nausea vomiting or shortness of breath.     Blood pressure 146/82, pulse of 59, temperature 97.9, respiration 18, saturating 98%  Labs showed stable BMP, troponin of 8.0, LFT stable.  Glucose of 91.  CBC stable with a WBC of 6.6 hemoglobin of 14.4.  INR of 0.96 PT of 12.9.  EKG shows normal sinus rhythm with possible left atrial enlargement     Imaging CT head shows no acute intracranial process, CTA head and neck shows no large artery occlusion in the head and neck.     In the ED patient received aspirin 325 mg.  Admission for further workup for TIA/stroke with neurology consult     Diagnoses this Admission and Hospital Course   #TIA rule out CVA  -Presented to the ED with complaints of numbness of left face, arm and leg.  Patient has a history of TIA  -History of left facial numbness for which he has followed up with the neurologist who initiated Lamictal  -He felt this episode was worse than past

## 2024-05-22 NOTE — PLAN OF CARE
Problem: Discharge Planning  Goal: Discharge to home or other facility with appropriate resources  5/21/2024 0926 by Mara Wesley RN  Outcome: Progressing  5/21/2024 0516 by Salma Jiménez RN  Outcome: Progressing     Problem: Pain  Goal: Verbalizes/displays adequate comfort level or baseline comfort level  5/21/2024 0926 by Mara Wesley RN  Outcome: Progressing  5/21/2024 0516 by Salma Jiménez RN  Outcome: Progressing     Problem: Safety - Adult  Goal: Free from fall injury  5/21/2024 0926 by Mara Wesley RN  Outcome: Progressing  5/21/2024 0516 by Salma Jiménez RN  Outcome: Progressing     Problem: Neurosensory - Adult  Goal: Achieves stable or improved neurological status  5/21/2024 0926 by Mara Wesley RN  Outcome: Progressing  5/21/2024 0516 by Salma Jiménez RN  Outcome: Progressing  Goal: Absence of seizures  5/21/2024 0926 by Mara Wesley RN  Outcome: Progressing  5/21/2024 0516 by Salma Jiménez RN  Outcome: Progressing  Goal: Remains free of injury related to seizures activity  5/21/2024 0926 by Mara Wesley RN  Outcome: Progressing  5/21/2024 0516 by Salma Jiménez RN  Outcome: Progressing  Goal: Achieves maximal functionality and self care  5/21/2024 0926 by Mara Wesley RN  Outcome: Progressing  5/21/2024 0516 by Salma Jiménez RN  Outcome: Progressing     Problem: Respiratory - Adult  Goal: Achieves optimal ventilation and oxygenation  5/21/2024 0926 by Mara Wesley RN  Outcome: Progressing  5/21/2024 0516 by Salma Jiménez RN  Outcome: Progressing     Problem: Cardiovascular - Adult  Goal: Maintains optimal cardiac output and hemodynamic stability  5/21/2024 0926 by Mara Wesley RN  Outcome: Progressing  5/21/2024 0516 by Salma Jiménez RN  Outcome: Progressing  Goal: Absence of cardiac dysrhythmias or at baseline  5/21/2024 0926 by Mara Wesley RN  Outcome: Progressing  5/21/2024 0516 by Salma Jiménez RN  Outcome: Progressing     Problem: Skin/Tissue Integrity - Adult  Goal: Skin integrity 
  Problem: Discharge Planning  Goal: Discharge to home or other facility with appropriate resources  Outcome: Progressing     Problem: Pain  Goal: Verbalizes/displays adequate comfort level or baseline comfort level  Outcome: Progressing     Problem: Safety - Adult  Goal: Free from fall injury  Outcome: Progressing     Problem: Neurosensory - Adult  Goal: Achieves stable or improved neurological status  Outcome: Progressing  Goal: Absence of seizures  Outcome: Progressing  Goal: Remains free of injury related to seizures activity  Outcome: Progressing  Goal: Achieves maximal functionality and self care  Outcome: Progressing     Problem: Respiratory - Adult  Goal: Achieves optimal ventilation and oxygenation  Outcome: Progressing     Problem: Cardiovascular - Adult  Goal: Maintains optimal cardiac output and hemodynamic stability  Outcome: Progressing  Goal: Absence of cardiac dysrhythmias or at baseline  Outcome: Progressing     Problem: Skin/Tissue Integrity - Adult  Goal: Skin integrity remains intact  Outcome: Progressing  Goal: Incisions, wounds, or drain sites healing without S/S of infection  Outcome: Progressing  Goal: Oral mucous membranes remain intact  Outcome: Progressing     Problem: Musculoskeletal - Adult  Goal: Return mobility to safest level of function  Outcome: Progressing  Goal: Maintain proper alignment of affected body part  Outcome: Progressing  Goal: Return ADL status to a safe level of function  Outcome: Progressing     Problem: Gastrointestinal - Adult  Goal: Minimal or absence of nausea and vomiting  Outcome: Progressing  Goal: Maintains or returns to baseline bowel function  Outcome: Progressing  Goal: Maintains adequate nutritional intake  Outcome: Progressing     Problem: Genitourinary - Adult  Goal: Absence of urinary retention  Outcome: Progressing     Problem: Infection - Adult  Goal: Absence of infection at discharge  Outcome: Progressing  Goal: Absence of infection during 
hospitalization  Outcome: Progressing     Problem: Metabolic/Fluid and Electrolytes - Adult  Goal: Electrolytes maintained within normal limits  Outcome: Progressing  Goal: Hemodynamic stability and optimal renal function maintained  Outcome: Progressing  Goal: Glucose maintained within prescribed range  Outcome: Progressing     Problem: Hematologic - Adult  Goal: Maintains hematologic stability  Outcome: Progressing

## 2024-05-23 ENCOUNTER — TELEPHONE (OUTPATIENT)
Age: 69
End: 2024-05-23

## 2024-05-23 LAB
ECHO BSA: 1.86 M2
LAMOTRIGINE SERPL-MCNC: 4.5 UG/ML (ref 3–15)

## 2024-05-23 NOTE — TELEPHONE ENCOUNTER
Jolanta pt spouse calling to schedule hospital fua from recent admission . LOV 4/16 next appt 10/22. Would you like to see patient back for hospital fua sooner than already scheduled appt in October, if so please advise when you would like to see pt back    Plan: 5/22/24     1.  Lamotrigine to 100 mg 3 times daily.  2.  Seizure precaution.  3.  Ativan 2 mg as needed for breakthrough seizure.     The patient can be discharged if there is no other concern.  Follow-up with neurology as an outpatient.  Neurology will sign off.

## 2024-05-23 NOTE — PROGRESS NOTES
Physician Progress Note      PATIENT:               OTILIO MURDOCK  CSN #:                  [id number]  :                       1955  ADMIT DATE:       2024 5:10 PM  DISCH DATE:        2024 4:19 PM  RESPONDING  PROVIDER #:        Kerline Tapia DO          QUERY TEXT:    Pt admitted with Mild tingling of left face, all other numbness/tinging. Pt   noted to have recurrence of same symptoms once a month Per Neuro note dated   . If possible, please document in progress notes and discharge summary if   you are evaluating and /or treating any of the following:      The medical record reflects the following:  Risk Factors: Epilepsy, HTN HLD  Clinical Indicators: per Neuro note dated , ' patient has been followed by   neurology for recurrent spell with left-sided numbness.  The patient was   started lamotrigine for possible focal seizure.  In the past, the patient had   multiple stroke workups without positive finding, Partial symptomatic epilepsy   with complex partial seizures, not intractable, without status epilepticus'  Treatment: patient agrees to increase lamotrigine to 100 mg 3 times daily   Seizure precaution Ativan 2 mg as needed for breakthrough seizure diagnostic   imaging, diagnostic labs      Thank you,  Ave Martinez RN BSN  Clinical   [email or web address]  Options provided:  -- Patient presents with TIA symptoms due to Seizure, TIA ruled out after   study  -- Other - I will add my own diagnosis  -- Disagree - Not applicable / Not valid  -- Disagree - Clinically unable to determine / Unknown  -- Refer to Clinical Documentation Reviewer    PROVIDER RESPONSE TEXT:    Patient presented with TIA symptoms due to seizure, TIA ruled out after study.    Query created by: Ave Martinez on 2024 12:58 PM      Electronically signed by:  Kerline Tapia DO 2024 8:34 AM          
4 Eyes Skin Assessment     NAME:  Josiah Barger  YOB: 1955  MEDICAL RECORD NUMBER:  [id number]    The patient is being assessed for  Admission    I agree that at least one RN has performed a thorough Head to Toe Skin Assessment on the patient. ALL assessment sites listed below have been assessed.      Areas assessed by both nurses:    Head, Face, Ears, Shoulders, Back, Chest, Arms, Elbows, Hands, Sacrum. Buttock, Coccyx, Ischium, and Legs. Feet and Heels        No Concerns at this time    Does the Patient have a Wound? No noted wound(s)       Wes Prevention initiated by RN: No  Wound Care Orders initiated by RN: No    Pressure Injury (Stage 3,4, Unstageable, DTI, NWPT, and Complex wounds) if present, place Wound referral order by RN under : No    New Ostomies, if present place, Ostomy referral order under : No     Nurse 1 eSignature: Electronically signed by Salma Jiménez RN on 5/21/24 at 5:14 AM EDT    **SHARE this note so that the co-signing nurse can place an eSignature**    Nurse 2 eSignature: Electronically signed by Estuardo Matson RN on 5/21/24 at 5:29 AM EDT   
Admission questions and assessment complete; see flow sheet.  Scheduled medications administered; See MAR.  IV flushed without difficulty.  Pt denies pain at this time. Pt denies any needs at this time. Pt educated on use of call light and to call out with needs, verbalized understanding, bed in low locked position for pt safety    
EEG completed and available for interpretation on the [facility] database .    
Handoff report and transfer of care given at bedside to  Mara RN.  Patient in stable condition, denies needs/concerns at this time.  Call light within reach.     
Handoff report and transfer of care given at bedside to Negro LILLY.  Patient in stable condition, denies needs/concerns at this time.  Call light within reach.     
Inpatient Physical Therapy Evaluation, Treatment, & Discharge Summary    Unit: [facility]  Date:  5/21/2024  Patient Name:    Josiah Barger  Admitting diagnosis:  TIA (transient ischemic attack) [G45.9]  Admit Date:  5/20/2024  Precautions/Restrictions/WB Status/ Lines/ Wounds/ Oxygen: Fall risk, Bed/chair alarm, and Telemetry      Pt seen for cotreatment this date due to limited functional status information    Treatment Time:  5891-5790  Treatment Number:  1   Timed Code Treatment Minutes: 12 minutes  Total Treatment Minutes:  12  minutes    Patient Stated Goals for Therapy: \" go home \"          Discharge Recommendations: Home with PRN assistance  DME needs for discharge: Needs Met       Therapy recommendation for EMS Transport: can transport by wheelchair    Therapy recommendations for staff:   Independent for ambulation with use of No AD within room    History of Present Illness:   5/20/24: Per ED note \"68 y.o. male  who presents to the ED complaining of numbness to the left face, arm and leg that started yesterday morning around 8 AM and lasted throughout the day.  Indicates that his left face was numb followed by his left arm and just the posterior aspect of his left knee.  He also endorses 1 hour of chest pain yesterday that started around 12:00.  Pain was not exertional, notes no shortness of breath or nausea associated with it.  States that when he woke up this morning he had no numbness and tingling.  Did have a mild headache that improved after couple coffee.  Called his neurologist advised to come in for evaluation due to the symptoms. \"    AM-PAC Mobility Score       AM-PAC Inpatient Mobility without Stair Climbing Raw Score : 20      Subjective  Patient lying reclined in bed with no family present.  Pt agreeable to this PT session.     Cognition    A&O x4   Able to follow 2 step commands    Pain   No  Location:   Rating: NA /10  Pain Medicine Status: Denies need    Preadmission Environment:   Pt lives 
Neurology Progress Note    ID: Josiah Barger is a 68 y.o. male    : 1955     LOS: 2 days     ASSESSMENT    Principal Problem:    TIA (transient ischemic attack)  Active Problems:    Lt facial numbness    Partial symptomatic epilepsy with complex partial seizures, not intractable, without status epilepticus (HCC)    Pain of left calf  Resolved Problems:    * No resolved hospital problems. *    The patient has no focal deficit during my assessment.    But the patient still complains of tingling sensation on her left face.  CTA brain and neck showed no significant hemodynamic stenosis.  CT brain showed no acute pathology.  EEG showed no evidence of seizure tendency.  It also showed mild degree of encephalopathy.     From neurology perspective, the patient has similar pattern of numbness.  This is not typical for TIA.  The patient has had this spell at least every month.    24: The patient is neurologically stabilized.  The patient remains to have minimal numbness in his left side.  But it keeps getting better.  The patient denies significant side effect with higher dose of lamotrigine.     Plan:     1.  Lamotrigine to 100 mg 3 times daily.  2.  Seizure precaution.  3.  Ativan 2 mg as needed for breakthrough seizure.    The patient can be discharged if there is no other concern.  Follow-up with neurology as an outpatient.  Neurology will sign off.    Medications:  Scheduled Meds:    sodium chloride flush  5-40 mL IntraVENous 2 times per day    enoxaparin  40 mg SubCUTAneous Daily    atorvastatin  80 mg Oral Nightly    aspirin  81 mg Oral Daily    Or    aspirin  300 mg Rectal Daily    lamoTRIgine  100 mg Oral TID     Continuous Infusions:    sodium chloride       PRN Meds: sodium chloride flush, sodium chloride, ondansetron **OR** ondansetron, polyethylene glycol    OBJECTIVE  Vital signs in the last 24 hours:  Vitals:    24 1045   BP: 114/68   Pulse: 68   Resp: 16   Temp: 98 °F (36.7 °C)   SpO2: 
Neurology consult called to Dr. Phelps on 5/21/24. Spoke to Tammie Garza  
Patient discharging to home in stable condition. Medications delivered to the patient. Discharge instructions reviewed.  
Patient is able to demonstrate the ability to move from a reclining position to an upright position within the recliner.    
Perfect Serve to Dr. Phelps as follows:    5/21/24 4:16 PM   [phone] Hospital or Facility: [facility] From: Mara Wesley RE: OTILIO MURDOCK 1955 RM: [id number] Just looking to confirm - pt still has some numbness/tingling to the left side of his face - no further testing from Neurology standpoint? Need Callback: NO CALLBACK REQ 2 [facility]  Read 4:31 PM   5/21/24 4:32 PM  It s okay. He has these multiple times in the past.  5/21/24 4:32 PM  MRIs were always neg.  5/21/24 4:33 PM  MRIs were always neg.    
Progress Note    Admit Date:  5/20/2024    Admitted with left sided numbness of face, arm and leg.    Subjective:  Mr. Barger reports feeling improved. Still having some tingling in left side of face, all other numbness and tingling has resolved. He has complaints of pain behind left knee and calf.     Objective:   Patient Vitals for the past 4 hrs:   BP Temp Temp src Pulse Resp SpO2   05/21/24 1200 119/64 98.5 °F (36.9 °C) Oral 77 16 98 %        No intake or output data in the 24 hours ending 05/21/24 1158    Physical Exam:    Gen: No distress. Alert and oriented. Appears stated age.   Eyes: PERRL. No sclera icterus. No conjunctival injection.   ENT: No discharge. Pharynx clear.   Neck: No JVD.  Trachea midline.  Resp: No accessory muscle use. No crackles. No wheezes. No rhonchi.   CV: Regular rate. Regular rhythm. No murmur.  No rub.   Capillary Refill: Brisk,< 3 seconds   GI: Non-tender. Non-distended.  Normal bowel sounds.  Skin: Warm and dry. No nodule on exposed extremities. No rash on exposed extremities.   M/S: No cyanosis. No joint deformity. No clubbing. +Weakness LLE  +left calf edema and TTP +homans sign  +varicose veins LLE  Neuro: Awake. Grossly nonfocal. Gait is steady. No drift. No facial drooping. Speech is clear.   Cranial nerves II through XII intact.  No pronator drift.  Finger to nose no deficit.   RUE 5/5 strength   LUE strength 4/5 and LLE 3/5  Patient is able to ambulate without difficulty.   Psych: Oriented x 3. No anxiety or agitation.      Current Facility-Administered Medications   Medication Dose Route Frequency Provider Last Rate Last Admin    lamoTRIgine (LAMICTAL) tablet 100 mg  100 mg Oral BID Will Velasquez MD   100 mg at 05/21/24 0842    sodium chloride flush 0.9 % injection 5-40 mL  5-40 mL IntraVENous 2 times per day Will Velasquez MD   10 mL at 05/21/24 0842    sodium chloride flush 0.9 % injection 5-40 mL  5-40 mL IntraVENous PRN Will Velasquez MD        0.9 % sodium 
Shift assessment complete; see flow sheet.  Scheduled medications administered; See MAR.  IV flushed without difficulty.  Pt denies pain at this time. Pt denies any needs at this time. Pt educated on use of call light and to call out with needs, verbalized understanding, bed in low locked position for pt safety    
Stroke Education provided to patient and the following topics were discussed    1. Patients personal risk factors for stroke are none    2. Warning signs of Stroke:        * Sudden numbness or weakness of the face, arm or leg, especially on one side of          The body            * Sudden confusion, trouble speaking or understanding        * Sudden trouble seeing in one or both eyes        * Sudden trouble walking, dizziness, loss of balance or coordination        * Sudden severe headache with no known cause      3. Importance of activation Emergency Medical Services ( 9-1-1 ) immediately if experience any warning signs of stroke.    4. Be sure and schedule a follow-up appointment with your primary care doctor or any specialists as instructed.     5. You must take medicine every day to treat your risk factors for stroke.  Be sure to take your medicines exactly as your doctor tells you: no more, no less.  Know what your medicines are for , what they do.  Anti-thrombotics /anticoagulants can help prevent strokes.  You are taking the following medicine(s)  81mg aspirin     6.  Smoking and second-hand smoke greatly increase your risk of stroke, cardiovascular disease and death. Smoking history ended year 1982    7. Information provided was Stroke Handouts    8. Documentation of teaching completed in Patient Education Activity and on Care Plan with teaching response noted?  yes   
TODAY'S DATE:  5/21/2024      Current NIHSS 0      Discussed personal risk factors for Stroke/TIA with patient/family, and ways to reduce the risk for a recurrent stroke.     Patient's personal risk factors which were identified are:   []   Alcohol Abuse: check with your physician before any alcohol consumption.   []   Atrial fibrillation: may cause blood clots  []   Drug Abuse: Seek help, talk with your doctor  []   Clotting Disorder  []   Diabetes  [x]   Family history of stroke or heart disease  [x]   High Blood Pressure/Hypertension: work with your physician  [x]   High cholesterol: monitor cholesterol levels with your physician  []   Overweight/Obesity: work with your physician for your ideal body weight  []   Physical Inactivity: get regular exercise as directed by your physician  []   Personal history of previous TIA or stroke  []   Poor Diet: decrease salt (sodium) in your diet, follow diet directed by physician  []   Smoking: stop smoking      Reviewed the Following Education with Patient and/or Family:   - Signs and Symptoms of Stroke  - Personal risk factors   - How to activate EMS (911)   - Importance of Follow Up Appointments at Discharge   - Importance of Compliance with Medications Prescribed at Discharge  - Available community resources and stroke advocacy groups if needed    Patient and/or family member: verbalized understanding.     Stroke Education booklet given to patient/family (or verified, if given already), which reviews above information. yes         Electronically signed by Salma Jiménez RN on 5/21/2024 at 5:15 AM       
Proprioception:  WFL    Coordination:  WFL    Tone:  WFL    Balance:  Sitting:    Independent  Standing:    Independent    Bed Mobility:   Supine to Sit:    Independent  Sit to Supine:   Not Tested  Rolling:   Not Tested  Scooting in sitting: Independent  Scooting in supine:  Not Tested    Transfers:    Sit to stand:    Independent  Stand to sit:    Independent  Bed to chair:     Independent  Bed/ chair to standard toilet:  Not Tested  Bed/chair to BSC:   Not Tested  Functional Mobility:   Independent in hallway    See PT note for gait analysis.    ADLs:  Dressing:      UE:   Not Tested  LE:    Independent don socks    Bathing:    UE:  Not Tested  LE:  Not Tested    Eating:   Not Tested    Toileting:  Not Tested    Grooming/hygiene: Not Tested-provided comb per patient request but patient states he will use later    Activity Tolerance:   Pt completed therapy session with no adverse symptoms-has numbness in L side of face throughout     BP (mmHg) HR (bpm) SpO2 (%) on RA Comments   Supine at rest       Seated at EOB       Standing       End of session         Positioning Needs:   Pt up in chair, no alarm needed, call light provided and all needs within reach .     Ther Ex / Activities Initiated:   N/A    Patient/Family Education:   Pt educated on role of inpatient OT, plan of care, importance of continued activity, DC recommendations.    CHF Education  N/A    Assessment:  Pt seen for Occupational therapy evaluation in acute care setting.  Pt functioning near baseline.    Recommending Home PRN assist upon discharge as patient functioning close to baseline level    Goal(s) :   Safe ADL transfers IND'ly MET 5/21/24    Plan:  DC acute OT    Electronically signed by Little Bernard OT on 5/21/2024 at 2:19 PM      If patient discharges from this facility prior to next visit, this note will serve as the Discharge Summary     
laboratories.    Fact sheet for Healthcare Providers:  https://www.fda.gov/media/849942/download  Fact sheet for Patients: https://www.fda.gov/media/829918/download    METHODOLOGY: Isothermal Nucleic Acid Amplification                   RADIOLOGY  Vascular duplex lower extremity venous left         CTA HEAD NECK W CONTRAST   Final Result   No large artery occlusion in the head or neck.         CT HEAD WO CONTRAST   Final Result   No acute intracranial abnormality.         XR CHEST PORTABLE   Final Result   Low lung volumes.  No acute cardiopulmonary disease.           Echo 6/23/23   Summary   Normal left ventricular systolic function with estimated ejection fraction   of 55-60%.   No obvious regional wall motion abnormalities are seen.   Normal left ventricular size with mild concentric LVH.   Grade I diastolic dysfunction with normal LV filling pressures.   Normal RV size and systolic function.   Mild pulmonic regurgitation.   Trivial mitral and tricuspid regurgitation.   A bubble study was performed and is of sub-optimal quality, but shows no   definitive evidence of a right-to-left atrial level shunt both with and   without Valsalva maneuver.      EEG 6/27/2023  INTERPRETATION:  This 25-minute, computer-assisted video EEG recording is abnormal.  It showed mild to moderate degree of generalized slowing background activity.  It also showed occasional generalized rhythmic delta activity (GRDA).  No potentially epileptiform activity was present during the recording.       The EEG findings were consistent with mild to moderate degree of generalized non-specific cerebral dysfunction.     The finding of GRDA was nonspecific.  It is commonly seen in metabolic encephalopathy.  However, a small portion of this pattern was related to seizure disorder.       Assessment/Plan:    #TIA rule out CVA  -Presented to the ED with complaints of numbness of left face, arm and leg.  Patient has a history of TIA  -History of left facial

## 2024-05-24 NOTE — TELEPHONE ENCOUNTER
Spoke with Jolanta whom was informed to have pt keep appt as scheduled and to CB with worsening sxs

## 2024-05-31 PROBLEM — E78.5 HYPERLIPIDEMIA: Status: ACTIVE | Noted: 2024-05-31

## 2024-05-31 PROBLEM — G40.909 SEIZURE DISORDER (HCC): Status: ACTIVE | Noted: 2024-05-31

## 2024-09-03 ENCOUNTER — TELEPHONE (OUTPATIENT)
Age: 69
End: 2024-09-03

## 2024-09-03 DIAGNOSIS — G40.209 PARTIAL SYMPTOMATIC EPILEPSY WITH COMPLEX PARTIAL SEIZURES, NOT INTRACTABLE, WITHOUT STATUS EPILEPTICUS (HCC): Primary | ICD-10-CM

## 2024-09-03 NOTE — TELEPHONE ENCOUNTER
Patient spouse calling in stating that patient had another episode on 8/31 that lasted several hours. Pt seizure diary states patient had 4 seizures in May, June, July, and 5 in August. Sxs include facial numbness on the right side, left arm numbness, chest pain, face pain, shoulder pain with mild discomfort. Patient is currently scheduled for fua 10/22/24. Patient is taking lamotrigine 100 mg TID since hospital d/c in May.  Pt was not aware to take ativan 2mg as needed for break through seizure. Patient needs a refill of both medications. Pending if appropriate

## 2024-09-04 RX ORDER — LORAZEPAM 2 MG/1
2 TABLET ORAL DAILY PRN
Qty: 30 TABLET | Refills: 1 | Status: SHIPPED | OUTPATIENT
Start: 2024-09-04 | End: 2024-10-22

## 2024-09-04 RX ORDER — LAMOTRIGINE 100 MG/1
100 TABLET ORAL 3 TIMES DAILY
Qty: 90 TABLET | Refills: 2 | Status: SHIPPED | OUTPATIENT
Start: 2024-09-04

## 2024-09-04 NOTE — TELEPHONE ENCOUNTER
LM on Jolanta VM to let her know of Dr Phelps's response. Informed her to CB with any further questions    6 (moderate pain)

## 2024-10-22 ENCOUNTER — OFFICE VISIT (OUTPATIENT)
Age: 69
End: 2024-10-22
Payer: MEDICARE

## 2024-10-22 VITALS
HEART RATE: 61 BPM | BODY MASS INDEX: 23.99 KG/M2 | HEIGHT: 69 IN | DIASTOLIC BLOOD PRESSURE: 74 MMHG | OXYGEN SATURATION: 100 % | SYSTOLIC BLOOD PRESSURE: 120 MMHG | WEIGHT: 162 LBS

## 2024-10-22 DIAGNOSIS — G40.209 PARTIAL SYMPTOMATIC EPILEPSY WITH COMPLEX PARTIAL SEIZURES, NOT INTRACTABLE, WITHOUT STATUS EPILEPTICUS (HCC): Primary | ICD-10-CM

## 2024-10-22 DIAGNOSIS — T88.7XXA SIDE EFFECT OF MEDICATION: ICD-10-CM

## 2024-10-22 PROCEDURE — 99214 OFFICE O/P EST MOD 30 MIN: CPT | Performed by: PSYCHIATRY & NEUROLOGY

## 2024-10-22 PROCEDURE — 1124F ACP DISCUSS-NO DSCNMKR DOCD: CPT | Performed by: PSYCHIATRY & NEUROLOGY

## 2024-10-22 PROCEDURE — 3074F SYST BP LT 130 MM HG: CPT | Performed by: PSYCHIATRY & NEUROLOGY

## 2024-10-22 PROCEDURE — 3078F DIAST BP <80 MM HG: CPT | Performed by: PSYCHIATRY & NEUROLOGY

## 2024-10-22 RX ORDER — CLONAZEPAM 1 MG/1
1 TABLET ORAL NIGHTLY PRN
Qty: 30 TABLET | Refills: 0 | Status: SHIPPED | OUTPATIENT
Start: 2024-10-22 | End: 2024-11-21

## 2024-10-22 NOTE — PATIENT INSTRUCTIONS
YOU MUST CONFIRM YOUR APPOINTMENT 1 DAY PRIOR OR IT WILL BE CANCELLED!!   Our office will call you 3 times the day prior to your appointment in an attempt to confirm.  Please return our call ASAP or confirm your appt through [employer] no later than 3 pm the day before your appointment.  If we do not hear back from you by 3 pm to confirm, your appointment will be cancelled & someone will be added into that slot from our wait list.

## 2024-10-25 ENCOUNTER — HOSPITAL ENCOUNTER (OUTPATIENT)
Age: 69
Discharge: HOME | End: 2024-10-25
Payer: MEDICARE

## 2024-10-25 DIAGNOSIS — G40.209 PARTIAL SYMPTOMATIC EPILEPSY WITH COMPLEX PARTIAL SEIZURES, NOT INTRACTABLE, WITHOUT STATUS EPILEPTICUS (HCC): ICD-10-CM

## 2024-10-25 PROCEDURE — 80175 DRUG SCREEN QUAN LAMOTRIGINE: CPT

## 2024-10-25 PROCEDURE — 36415 COLL VENOUS BLD VENIPUNCTURE: CPT

## 2024-10-26 LAB — LAMOTRIGINE SERPL-MCNC: 6.6 UG/ML (ref 3–15)

## 2024-12-02 DIAGNOSIS — G40.209 PARTIAL SYMPTOMATIC EPILEPSY WITH COMPLEX PARTIAL SEIZURES, NOT INTRACTABLE, WITHOUT STATUS EPILEPTICUS (HCC): ICD-10-CM

## 2024-12-02 RX ORDER — LAMOTRIGINE 100 MG/1
TABLET ORAL
Qty: 90 TABLET | Refills: 3 | Status: SHIPPED | OUTPATIENT
Start: 2024-12-02

## 2024-12-19 ENCOUNTER — APPOINTMENT (OUTPATIENT)
Dept: CT IMAGING | Age: 69
End: 2024-12-19
Payer: MEDICARE

## 2024-12-19 ENCOUNTER — HOSPITAL ENCOUNTER (OUTPATIENT)
Age: 69
Setting detail: OBSERVATION
Discharge: HOME | End: 2024-12-20
Attending: EMERGENCY MEDICINE | Admitting: INTERNAL MEDICINE
Payer: MEDICARE

## 2024-12-19 ENCOUNTER — APPOINTMENT (OUTPATIENT)
Dept: MRI IMAGING | Age: 69
End: 2024-12-19
Payer: MEDICARE

## 2024-12-19 DIAGNOSIS — U07.1 COVID: Primary | ICD-10-CM

## 2024-12-19 DIAGNOSIS — R20.2 PARESTHESIAS: ICD-10-CM

## 2024-12-19 PROBLEM — Z86.73 HX OF TRANSIENT ISCHEMIC ATTACK (TIA): Status: ACTIVE | Noted: 2024-12-19

## 2024-12-19 PROBLEM — R56.9 SEIZURE (HCC): Status: ACTIVE | Noted: 2024-05-31

## 2024-12-19 LAB
ALBUMIN SERPL-MCNC: 4 G/DL (ref 3.4–5)
ALBUMIN/GLOB SERPL: 1.3 {RATIO} (ref 1.1–2.2)
ALP SERPL-CCNC: 70 U/L (ref 40–129)
ALT SERPL-CCNC: 11 U/L (ref 10–40)
ANION GAP SERPL CALCULATED.3IONS-SCNC: 9 MMOL/L (ref 3–16)
AST SERPL-CCNC: 14 U/L (ref 15–37)
BASOPHILS # BLD: 0 K/UL (ref 0–0.2)
BASOPHILS NFR BLD: 0.9 %
BILIRUB SERPL-MCNC: 0.5 MG/DL (ref 0–1)
BUN SERPL-MCNC: 13 MG/DL (ref 7–20)
CALCIUM SERPL-MCNC: 8.7 MG/DL (ref 8.3–10.6)
CHLORIDE SERPL-SCNC: 105 MMOL/L (ref 99–110)
CHP ED QC CHECK: YES
CO2 SERPL-SCNC: 27 MMOL/L (ref 21–32)
CREAT SERPL-MCNC: 0.9 MG/DL (ref 0.8–1.3)
DEPRECATED RDW RBC AUTO: 13.7 % (ref 12.4–15.4)
EKG ATRIAL RATE: 61 BPM
EKG DIAGNOSIS: NORMAL
EKG P AXIS: 62 DEGREES
EKG P-R INTERVAL: 172 MS
EKG Q-T INTERVAL: 398 MS
EKG QRS DURATION: 116 MS
EKG QTC CALCULATION (BAZETT): 400 MS
EKG R AXIS: -33 DEGREES
EKG T AXIS: 38 DEGREES
EKG VENTRICULAR RATE: 61 BPM
EOSINOPHIL # BLD: 0.2 K/UL (ref 0–0.6)
EOSINOPHIL NFR BLD: 3.4 %
FLUAV RNA RESP QL NAA+PROBE: NOT DETECTED
FLUBV RNA RESP QL NAA+PROBE: NOT DETECTED
FOLATE SERPL-MCNC: 11.4 NG/ML (ref 4.78–24.2)
GFR SERPLBLD CREATININE-BSD FMLA CKD-EPI: >90 ML/MIN/{1.73_M2}
GLUCOSE BLD-MCNC: 92 MG/DL
GLUCOSE BLD-MCNC: 92 MG/DL (ref 70–99)
GLUCOSE SERPL-MCNC: 89 MG/DL (ref 70–99)
HCT VFR BLD AUTO: 42.5 % (ref 40.5–52.5)
HGB BLD-MCNC: 14.4 G/DL (ref 13.5–17.5)
INR PPP: 0.96 (ref 0.85–1.15)
LYMPHOCYTES # BLD: 1.6 K/UL (ref 1–5.1)
LYMPHOCYTES NFR BLD: 31.9 %
MCH RBC QN AUTO: 32.8 PG (ref 26–34)
MCHC RBC AUTO-ENTMCNC: 33.9 G/DL (ref 31–36)
MCV RBC AUTO: 96.6 FL (ref 80–100)
MONOCYTES # BLD: 0.4 K/UL (ref 0–1.3)
MONOCYTES NFR BLD: 7.7 %
NEUTROPHILS # BLD: 2.9 K/UL (ref 1.7–7.7)
NEUTROPHILS NFR BLD: 56.1 %
PERFORMED ON: NORMAL
PLATELET # BLD AUTO: 291 K/UL (ref 135–450)
PMV BLD AUTO: 6.7 FL (ref 5–10.5)
POTASSIUM SERPL-SCNC: 4.3 MMOL/L (ref 3.5–5.1)
PROT SERPL-MCNC: 7 G/DL (ref 6.4–8.2)
PROTHROMBIN TIME: 13 SEC (ref 11.9–14.9)
RBC # BLD AUTO: 4.4 M/UL (ref 4.2–5.9)
SARS-COV-2 RNA RESP QL NAA+PROBE: DETECTED
SODIUM SERPL-SCNC: 141 MMOL/L (ref 136–145)
TROPONIN, HIGH SENSITIVITY: 7 NG/L (ref 0–22)
VIT B12 SERPL-MCNC: 886 PG/ML (ref 211–911)
WBC # BLD AUTO: 5.1 K/UL (ref 4–11)

## 2024-12-19 PROCEDURE — 99222 1ST HOSP IP/OBS MODERATE 55: CPT

## 2024-12-19 PROCEDURE — 70498 CT ANGIOGRAPHY NECK: CPT

## 2024-12-19 PROCEDURE — 6370000000 HC RX 637 (ALT 250 FOR IP): Performed by: EMERGENCY MEDICINE

## 2024-12-19 PROCEDURE — 82746 ASSAY OF FOLIC ACID SERUM: CPT

## 2024-12-19 PROCEDURE — 6360000002 HC RX W HCPCS: Performed by: NURSE PRACTITIONER

## 2024-12-19 PROCEDURE — G0378 HOSPITAL OBSERVATION PER HR: HCPCS

## 2024-12-19 PROCEDURE — 6370000000 HC RX 637 (ALT 250 FOR IP)

## 2024-12-19 PROCEDURE — 87636 SARSCOV2 & INF A&B AMP PRB: CPT

## 2024-12-19 PROCEDURE — 99223 1ST HOSP IP/OBS HIGH 75: CPT | Performed by: PSYCHIATRY & NEUROLOGY

## 2024-12-19 PROCEDURE — 99285 EMERGENCY DEPT VISIT HI MDM: CPT

## 2024-12-19 PROCEDURE — 96372 THER/PROPH/DIAG INJ SC/IM: CPT

## 2024-12-19 PROCEDURE — 93005 ELECTROCARDIOGRAM TRACING: CPT | Performed by: EMERGENCY MEDICINE

## 2024-12-19 PROCEDURE — 70551 MRI BRAIN STEM W/O DYE: CPT

## 2024-12-19 PROCEDURE — 36415 COLL VENOUS BLD VENIPUNCTURE: CPT

## 2024-12-19 PROCEDURE — 85025 COMPLETE CBC W/AUTO DIFF WBC: CPT

## 2024-12-19 PROCEDURE — 82607 VITAMIN B-12: CPT

## 2024-12-19 PROCEDURE — 6360000004 HC RX CONTRAST MEDICATION: Performed by: EMERGENCY MEDICINE

## 2024-12-19 PROCEDURE — 70450 CT HEAD/BRAIN W/O DYE: CPT

## 2024-12-19 PROCEDURE — 6370000000 HC RX 637 (ALT 250 FOR IP): Performed by: NURSE PRACTITIONER

## 2024-12-19 PROCEDURE — 93010 ELECTROCARDIOGRAM REPORT: CPT | Performed by: INTERNAL MEDICINE

## 2024-12-19 PROCEDURE — 84484 ASSAY OF TROPONIN QUANT: CPT

## 2024-12-19 PROCEDURE — 80053 COMPREHEN METABOLIC PANEL: CPT

## 2024-12-19 PROCEDURE — 85610 PROTHROMBIN TIME: CPT

## 2024-12-19 RX ORDER — ENOXAPARIN SODIUM 100 MG/ML
40 INJECTION SUBCUTANEOUS DAILY
Status: DISCONTINUED | OUTPATIENT
Start: 2024-12-19 | End: 2024-12-20 | Stop reason: HOSPADM

## 2024-12-19 RX ORDER — ATORVASTATIN CALCIUM 40 MG/1
80 TABLET, FILM COATED ORAL NIGHTLY
Status: DISCONTINUED | OUTPATIENT
Start: 2024-12-19 | End: 2024-12-20 | Stop reason: HOSPADM

## 2024-12-19 RX ORDER — SODIUM CHLORIDE 0.9 % (FLUSH) 0.9 %
5-40 SYRINGE (ML) INJECTION PRN
Status: DISCONTINUED | OUTPATIENT
Start: 2024-12-19 | End: 2024-12-20 | Stop reason: HOSPADM

## 2024-12-19 RX ORDER — ASPIRIN 300 MG/1
300 SUPPOSITORY RECTAL DAILY
Status: DISCONTINUED | OUTPATIENT
Start: 2024-12-20 | End: 2024-12-20 | Stop reason: HOSPADM

## 2024-12-19 RX ORDER — ASPIRIN 81 MG/1
81 TABLET, CHEWABLE ORAL DAILY
Status: DISCONTINUED | OUTPATIENT
Start: 2024-12-20 | End: 2024-12-20 | Stop reason: HOSPADM

## 2024-12-19 RX ORDER — LORAZEPAM 0.5 MG/1
0.5 TABLET ORAL EVERY 6 HOURS PRN
COMMUNITY

## 2024-12-19 RX ORDER — ONDANSETRON 2 MG/ML
4 INJECTION INTRAMUSCULAR; INTRAVENOUS EVERY 6 HOURS PRN
Status: DISCONTINUED | OUTPATIENT
Start: 2024-12-19 | End: 2024-12-20 | Stop reason: HOSPADM

## 2024-12-19 RX ORDER — SODIUM CHLORIDE 0.9 % (FLUSH) 0.9 %
5-40 SYRINGE (ML) INJECTION EVERY 12 HOURS SCHEDULED
Status: DISCONTINUED | OUTPATIENT
Start: 2024-12-19 | End: 2024-12-20 | Stop reason: HOSPADM

## 2024-12-19 RX ORDER — SODIUM CHLORIDE 9 MG/ML
INJECTION, SOLUTION INTRAVENOUS PRN
Status: DISCONTINUED | OUTPATIENT
Start: 2024-12-19 | End: 2024-12-20 | Stop reason: HOSPADM

## 2024-12-19 RX ORDER — LAMOTRIGINE 100 MG/1
100 TABLET ORAL 3 TIMES DAILY
Status: DISCONTINUED | OUTPATIENT
Start: 2024-12-19 | End: 2024-12-20 | Stop reason: HOSPADM

## 2024-12-19 RX ORDER — POLYETHYLENE GLYCOL 3350 17 G/17G
17 POWDER, FOR SOLUTION ORAL DAILY PRN
Status: DISCONTINUED | OUTPATIENT
Start: 2024-12-19 | End: 2024-12-20 | Stop reason: HOSPADM

## 2024-12-19 RX ORDER — ASPIRIN 81 MG/1
324 TABLET, CHEWABLE ORAL ONCE
Status: COMPLETED | OUTPATIENT
Start: 2024-12-19 | End: 2024-12-19

## 2024-12-19 RX ORDER — IOPAMIDOL 755 MG/ML
75 INJECTION, SOLUTION INTRAVASCULAR
Status: COMPLETED | OUTPATIENT
Start: 2024-12-19 | End: 2024-12-19

## 2024-12-19 RX ORDER — ONDANSETRON 4 MG/1
4 TABLET, ORALLY DISINTEGRATING ORAL EVERY 8 HOURS PRN
Status: DISCONTINUED | OUTPATIENT
Start: 2024-12-19 | End: 2024-12-20 | Stop reason: HOSPADM

## 2024-12-19 RX ADMIN — ASPIRIN 324 MG: 81 TABLET, CHEWABLE ORAL at 10:47

## 2024-12-19 RX ADMIN — LAMOTRIGINE 100 MG: 100 TABLET ORAL at 14:56

## 2024-12-19 RX ADMIN — LAMOTRIGINE 100 MG: 100 TABLET ORAL at 21:41

## 2024-12-19 RX ADMIN — ENOXAPARIN SODIUM 40 MG: 100 INJECTION SUBCUTANEOUS at 14:56

## 2024-12-19 RX ADMIN — ATORVASTATIN CALCIUM 80 MG: 40 TABLET, FILM COATED ORAL at 21:41

## 2024-12-19 RX ADMIN — IOPAMIDOL 75 ML: 755 INJECTION, SOLUTION INTRAVENOUS at 10:31

## 2024-12-19 ASSESSMENT — ENCOUNTER SYMPTOMS
BLOOD IN STOOL: 0
FACIAL SWELLING: 0
COLOR CHANGE: 0
TROUBLE SWALLOWING: 0
SHORTNESS OF BREATH: 0
VOICE CHANGE: 0
VOMITING: 0
NAUSEA: 0
STRIDOR: 0
BACK PAIN: 0
PHOTOPHOBIA: 0
WHEEZING: 0
ABDOMINAL PAIN: 0

## 2024-12-19 NOTE — PROGRESS NOTES
Consult has been called to Dr. rodriguez on 12/19/24. Spoke with dr rodriguez via perfect serve. 1:50 PM    Kat England  12/19/2024

## 2024-12-19 NOTE — PROGRESS NOTES
Physical and Occupational Therapy Discharge    Orders received and chart reviewed. Pt stating he has no skilled acute therapy needs at this time. Discharging orders at this time. Please re-order if needed.     Keyonna De Guzman, PT, DPT  Little Bernard, OTR/L 6750

## 2024-12-19 NOTE — ED PROVIDER NOTES
[facility] ED  eMERGENCY dEPARTMENT eNCOUnter      Pt Name: Josiah Barger  MRN: [id number]  Birthdate 1955  Date of evaluation: 12/19/2024  Provider: Josiah Rodriguez MD    CHIEF COMPLAINT       Chief Complaint   Patient presents with    Numbness     Pt reports feeling \"like crud\" for last few days, pt reports starting this am about 0930 his left toes started feeling tingles and then numbness to left leg, left arm, and left side of face       HISTORY OF PRESENT ILLNESS   (Location/Symptom, Timing/Onset, Context/Setting, Quality, Duration, Modifying Factors, Severity)  Note limiting factors.     History obtained from: the patient     Josiah Barger is a 69 y.o. male with hx of hypertension, hyperlipidemia, and previous TIAs with left-sided numbness who presents due to severe paresthesias in his left leg arm and face starting approximately 930 which have significantly but not resolved.  The patient reports that he feels the symptoms to started this morning however the wife reports that he has been dropping things such as remote controls and his coffee cup for approximately 1 week.  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  He does report he was in his normal state of health until approximately 9:30 AM this morning when he started noticing significant paresthesias in his left toes which ultimately went up his leg, throughout his entire left arm, and his entire left face.  Patient denies any weakness or complete lack of sensation but reports intense paresthesias.  Patient reports this has resolved in his left leg and arm however persist in his left face.  Denies any other neurologic deficits.    HPI    Nursing Notes were reviewed.    REVIEW OFSYSTEMS    (2-9 systems for level 4, 10 or more for level 5)     Review of Systems   Constitutional:  Negative for appetite change, fever and unexpected weight  change.   HENT:  Negative for facial swelling, trouble swallowing and voice change.    Eyes:  Negative for photophobia and visual disturbance.   Respiratory:  Negative for shortness of breath, wheezing and stridor.    Cardiovascular:  Negative for chest pain and palpitations.   Gastrointestinal:  Negative for abdominal pain, blood in stool, nausea and vomiting.   Genitourinary:  Negative for difficulty urinating and dysuria.   Musculoskeletal:  Negative for back pain, gait problem and neck pain.   Skin:  Negative for color change and wound.   Neurological:  Positive for numbness. Negative for seizures, syncope and speech difficulty.   Psychiatric/Behavioral:  Negative for self-injury and suicidal ideas.        Except as noted above the remainder of the review of systems was reviewed and negative.       PAST MEDICAL HISTORY     Past Medical History:   Diagnosis Date    Abdominal pain     COVID-19     Degenerative disc disease     Hyperlipidemia     Hypertension          SURGICAL HISTORY       Past Surgical History:   Procedure Laterality Date    EYE SURGERY Left     cataract    HERNIA REPAIR           CURRENT MEDICATIONS       Previous Medications    ASPIRIN 81 MG EC TABLET    Take 1 tablet by mouth daily Take daily    ATORVASTATIN (LIPITOR) 40 MG TABLET    Take 1 tablet by mouth nightly    CLONAZEPAM (KLONOPIN) 1 MG TABLET    Take 1 tablet by mouth nightly as needed (Seizure or insomnia) for up to 30 days. Max Daily Amount: 1 mg    COENZYME Q10 15 MG TABS    Take 1 tablet by mouth    FISH OIL-OMEGA-3 FATTY ACIDS 1000 MG CAPSULE    Take 2 capsules by mouth in the morning and at bedtime  2 capsules in morning 2 at night    LAMOTRIGINE (LAMICTAL) 100 MG TABLET    TAKE 1 TABLET BY MOUTH 3 TIMES A DAY IN THE MORNING, AT NOON, AND AT BEDTIME    PROBIOTIC PRODUCT (PROBIOTIC DAILY PO)    Take by mouth    TURMERIC PO    Take by mouth       ALLERGIES     Cephalexin    FAMILY HISTORY       Family History   Problem Relation Age

## 2024-12-19 NOTE — PROGRESS NOTES
Patient admitted to room 233. Oriented to room and call light. Bed wheels locked. Call light within reach and use of call light explained to patient.  No other needs identified at this time. Will continue to monitor.    4 Eyes Skin Assessment     The patient is being assess for   Admission    I agree that 2 RN's have performed a thorough Head to Toe Skin Assessment on the patient. ALL assessment sites listed below have been assessed.      Areas assessed for pressure by both nurses:   []   Head, Face, and Ears   [x]   Shoulders, Back, and Chest, Abdomen  [x]   Arms, Elbows, and Hands   []   Coccyx, Sacrum, and Ischium  [x]   Legs, Feet, and Heels        Skin Assessed Under all Medical Devices by both nurses:  IV               All Mepilex Borders were peeled back and area peeked at by both nurses:  No: n/a  Please list where Mepilex Borders are located:  n/a             **SHARE this note so that the co-signing nurse is able to place an eSignature**    Co-signer eSignature: Electronically signed by Ayla Riley RN on 12/19/24 at 3:53 PM EST    Does the Patient have Skin Breakdown related to pressure?  No     (Insert Photo here)         Wes Prevention initiated:  No   Wound Care Orders initiated:  No      [facility] nurse consulted for Pressure Injury (Stage 3,4, Unstageable, DTI, NWPT, Complex wounds)and New or Established Ostomies:  No      Primary Nurse eSignature: Electronically signed by Veena Lepe RN on 12/19/24 at 3:06 PM EST          Bedside Mobility Assessment Tool (BMAT):     Assessment Level 1- Sit and Shake    1. From a semi-reclined position, ask patient to sit up and rotate to a seated position at the side of the bed. Can use the bedrail.    2. Ask patient to reach out and grab your hand and shake making sure patient reaches across his/her midline.   Pass- Patient is able to come to a seated position, maintain core strength. Maintains seated balance while reaching across midline. Move on to Assessment

## 2024-12-19 NOTE — ACP (ADVANCE CARE PLANNING)
Advance Care Planning     General Advance Care Planning (ACP) Conversation    Date of Conversation: 12/19/2024  Conducted with: Patient with Decision Making Capacity  Other persons present: None    Healthcare Decision Maker: No healthcare decision makers have been documented.       Content/Action Overview:  DECLINED ACP Conversation - will revisit periodically  Reviewed DNR/DNI and patient elects Full Code (Attempt Resuscitation)        Length of Voluntary ACP Conversation in minutes:  <16 minutes (Non-Billable)    Parth Lopes RN

## 2024-12-19 NOTE — CARE COORDINATION
Case Management Assessment  Initial Evaluation    Date/Time of Evaluation: 12/19/2024 2:47 PM  Assessment Completed by: Parth Lopes RN    If patient is discharged prior to next notation, then this note serves as note for discharge by case management.    Patient Name: Josiah Barger                   YOB: 1955  Diagnosis: TIA (transient ischemic attack) [G45.9]  Paresthesias [R20.2]  COVID [U07.1]                   Date / Time: 12/19/2024 10:14 AM    Patient Admission Status: Observation   Readmission Risk (Low < 19, Mod (19-27), High > 27): Readmission Risk Score: 5    Current PCP: Alvarez Jones MD  PCP verified by CM? Yes    Chart Reviewed: Yes      History Provided by: Patient  Patient Orientation: Alert and Oriented    Patient Cognition: Alert    Hospitalization in the last 30 days (Readmission):  No    If yes, Readmission Assessment in CM Navigator will be completed.    Advance Directives:      Code Status: Full Code   Patient's Primary Decision Maker is: Legal Next of Kin      Discharge Planning:    Patient lives with: Spouse/Significant Other Type of Home: House  Primary Care Giver: Self  Patient Support Systems include: Spouse/Significant Other   Current Financial resources: Medicare  Current community resources: None  Current services prior to admission: None            Current DME:              Type of Home Care services:  None    ADLS  Prior functional level: Independent in ADLs/IADLs  Current functional level: Independent in ADLs/IADLs    PT AM-PAC:   /24  OT AM-PAC:   /24    Family can provide assistance at DC: Yes  Would you like Case Management to discuss the discharge plan with any other family members/significant others, and if so, who? No  Plans to Return to Present Housing: Yes  Other Identified Issues/Barriers to RETURNING to current housing: na    Potential Assistance needed at discharge: N/A            Potential DME:    Patient expects to discharge to: House  Plan for

## 2024-12-19 NOTE — H&P
Hospital Medicine History & Physical      PCP: Alvarez Jones MD    Date of Admission: 12/19/2024    Date of Service: Pt seen/examined on 12/19/2024     Chief Complaint:    Chief Complaint   Patient presents with    Numbness     Pt reports feeling \"like crud\" for last few days, pt reports starting this am about 0930 his left toes started feeling tingles and then numbness to left leg, left arm, and left side of face         History Of Present Illness:      The patient is a 69 y.o. male with pmhx of HTN, HLD who presented to [facility] ED with complaint of not feeling well. This morning around 830 am he started to develop a \"pins and needles sensation\" in his left foot, then began to travel up into his leg and he eventually had it is in left arm as well. Denies weakness or numbness more of just a tingling. Did have some numbness on the left side of his face. No speech changes or facial drooping.   Symptoms resolved 1 hour ago.   Denies any headache or vision changes. No difficulty with ambulation.       Past Medical History:        Diagnosis Date    Abdominal pain     COVID-19     Degenerative disc disease     Hyperlipidemia     Hypertension        Past Surgical History:        Procedure Laterality Date    EYE SURGERY Left     cataract    HERNIA REPAIR         Medications Prior to Admission:    Prior to Admission medications    Medication Sig Start Date End Date Taking? Authorizing Provider   LORazepam (ATIVAN) 0.5 MG tablet Take 1 tablet by mouth every 6 hours as needed for Anxiety. Max Daily Amount: 2 mg   Yes ProviderBrady MD   lamoTRIgine (LAMICTAL) 100 MG tablet TAKE 1 TABLET BY MOUTH 3 TIMES A DAY IN THE MORNING, AT NOON, AND AT BEDTIME 12/2/24  Yes Miri Padilla MD   aspirin 81 MG EC tablet Take 1 tablet by mouth daily Take daily 5/22/24  Yes J Luis Bradshaw MD   fish oil-omega-3 fatty acids 1000 MG capsule Take 2 capsules by mouth in the morning and at bedtime  2 capsules in morning 2  performed using PEDRO PABLO YADIRA SARS-CoV-2 and Influenza A/B  nucleic acid assay. This test is a multiplex Real-Time Reverse  Transcriptase Polymerase Chain Reaction (RT-PCR)-based in vitro  diagnostic test intended for the qualitative detection of nucleic  acids from SARS-CoV-2, influenza A, and influenza B in nasopharyngeal  and nasal swab specimens for use under the FDA’s Emergency Use  Authorization (EUA) only.    Patient Fact Sheet:  https://www.fda.gov/media/729933/download  Provider Fact Sheet: https://www.fda.gov/media/226088/download  EUA: https://www.fda.gov/media/138628/download  IFU: https://www.fda.gov/media/128383/download    Methodology:  RT-PCR          Influenza A NOT DETECTED     Comment: Note:  Influenza A and Influenza B negative results should be considered  presumptive in samples that have a Detected SARS-CoV-2 result.  Consider  re-testing with an alternate FDA-approved test for Flu A & B if clinically  indicated.          Influenza B NOT DETECTED     Comment: Note:  Influenza A and Influenza B negative results should be considered  presumptive in samples that have a Detected SARS-CoV-2 result.  Consider  re-testing with an alternate FDA-approved test for Flu A & B if clinically  indicated.                   EKG:   Encounter Date: 12/19/24   EKG 12 Lead - Recommend after Head CT performed   Result Value    Ventricular Rate 61    Atrial Rate 61    P-R Interval 172    QRS Duration 116    Q-T Interval 398    QTc Calculation (Bazett) 400    P Axis 62    R Axis -33    T Axis 38    Diagnosis      Normal sinus rhythmLeft axis deviationAbnormal ECGWhen compared with ECG of 20-MAY-2024 17:15,Questionable change in QRS duration         RADIOLOGY  CTA HEAD NECK W CONTRAST   Final Result   No large vessel occlusion in the head or neck.         CT HEAD WO CONTRAST   Final Result   1. No acute intracranial abnormality.   Findings were discussed with OTILIO BOO at 10:39 am on 12/19/2024.         MRI brain

## 2024-12-19 NOTE — PLAN OF CARE
Stroke Team Note    Called for left sided parasthesias that traveled up toes to leg and face, no improving. NIHSS 1. CTH without acute findings, CTA without LVO. Did not recommend TNK given non-disabling symptoms    Paul Wechsler, MD   Stroke Team

## 2024-12-19 NOTE — CONSULTS
Neurology consultation note    Patient name: Josiah Barger      Chief Complaint:  Transient left-sided numbness.    History of present illness:  This is a 69 years old male who was brought to the hospital this morning after the patient developed another episode of left-sided numbness.  However, this time the patient felt the numbness started from his left toes been spreading to the left leg, left arm and left face.  Per the patient, this is different his typical spell.  The patient had a concern for possible CVA so the patient decided to come to the hospital.  The patient was seen in neurology clinic by October.  The patient has been on lamotrigine 100 mg 3 times daily without adjustment.  The patient had not had any further spell for almost 3 months prior to this visit.  In the past, the spell occurred once or twice a month.  The patient denies significant side effects from lamotrigine.  Upon admission blood pressure was normal.  The patient had normal CTA of head and neck.    Past medical history:    Past Medical History:   Diagnosis Date    Abdominal pain     COVID-19     Degenerative disc disease     Hyperlipidemia     Hypertension        Past surgical history:    Past Surgical History:   Procedure Laterality Date    EYE SURGERY Left     cataract    HERNIA REPAIR          Medication:    Current Facility-Administered Medications   Medication Dose Route Frequency Provider Last Rate Last Admin    sodium chloride flush 0.9 % injection 5-40 mL  5-40 mL IntraVENous 2 times per day Parvin Simmons APRN - CNP        sodium chloride flush 0.9 % injection 5-40 mL  5-40 mL IntraVENous PRN Simmons, Parvin, APRN - CNP        0.9 % sodium chloride infusion   IntraVENous PRN SimmonsParvin, APRN - CNP        ondansetron (ZOFRAN-ODT) disintegrating tablet 4 mg  4 mg Oral Q8H PRN Parvin Simmons, APRN - CNP        Or    ondansetron (ZOFRAN) injection 4 mg  4 mg IntraVENous Q6H PRN Simmons, Parvin, APRN - CNP        polyethylene glycol  dysarthria.  CRANIAL NERVES: No facial asymmetry.  MOTOR: No pronator drift or leg drift.  REFLEXES: Generalized 2+.  SENSORY: Grossly intact.  COORD: No tremor.    Imaging, procedure, and laboratory data:   There is no relevant study to review at this visit.    Assessment:    Principal Problem:    TIA (transient ischemic attack)  Active Problems:    Seizure (HCC)    COVID    Paresthesias    Hx of transient ischemic attack (TIA)  Resolved Problems:    * No resolved hospital problems. *     The examination showed no focal neurological deficit.  The CT brain showed no acute ischemic injury or intracerebral hemorrhage.  The CTA of head and neck showed no significant hemodynamic stenosis.  MRI brain is pending.    Based on the history, this is likely another episode of possible focal seizure.    Plan:    1.  Agree with MRI brain.  2.  Continue lamotrigine 100 mg 3 times a day.  3.  Ativan 2 mg as needed for the spell.  4.  Seizure precaution.  5.  The target blood pressure below than 140/90.      75 minutes were spent with the patient with greater than 50% of the time spent in counseling and coordination of care.    Miri Padilla MD

## 2024-12-19 NOTE — ED NOTES
1022 Paged \"code Stroke\" overhead. Called CT and they are ready for Pt.   1023 Placed call to  Stroke for stat consult.   1028  Stroke Team returned call back and spoke directly to Dr. Rodriguez

## 2024-12-20 VITALS
DIASTOLIC BLOOD PRESSURE: 79 MMHG | HEART RATE: 70 BPM | BODY MASS INDEX: 24.28 KG/M2 | RESPIRATION RATE: 18 BRPM | HEIGHT: 68 IN | TEMPERATURE: 97.9 F | WEIGHT: 160.2 LBS | OXYGEN SATURATION: 95 % | SYSTOLIC BLOOD PRESSURE: 131 MMHG

## 2024-12-20 DIAGNOSIS — G40.209 PARTIAL SYMPTOMATIC EPILEPSY WITH COMPLEX PARTIAL SEIZURES, NOT INTRACTABLE, WITHOUT STATUS EPILEPTICUS (HCC): Primary | ICD-10-CM

## 2024-12-20 LAB
ANION GAP SERPL CALCULATED.3IONS-SCNC: 12 MMOL/L (ref 3–16)
BASOPHILS # BLD: 0.1 K/UL (ref 0–0.2)
BASOPHILS NFR BLD: 1 %
BUN SERPL-MCNC: 16 MG/DL (ref 7–20)
CALCIUM SERPL-MCNC: 8.5 MG/DL (ref 8.3–10.6)
CHLORIDE SERPL-SCNC: 107 MMOL/L (ref 99–110)
CHOLEST SERPL-MCNC: 222 MG/DL (ref 0–199)
CO2 SERPL-SCNC: 22 MMOL/L (ref 21–32)
CREAT SERPL-MCNC: 1 MG/DL (ref 0.8–1.3)
DEPRECATED RDW RBC AUTO: 13.9 % (ref 12.4–15.4)
EOSINOPHIL # BLD: 0.3 K/UL (ref 0–0.6)
EOSINOPHIL NFR BLD: 4.2 %
GFR SERPLBLD CREATININE-BSD FMLA CKD-EPI: 81 ML/MIN/{1.73_M2}
GLUCOSE SERPL-MCNC: 81 MG/DL (ref 70–99)
HCT VFR BLD AUTO: 42.2 % (ref 40.5–52.5)
HDLC SERPL-MCNC: 43 MG/DL (ref 40–60)
HGB BLD-MCNC: 14.2 G/DL (ref 13.5–17.5)
LDLC SERPL CALC-MCNC: 155 MG/DL
LYMPHOCYTES # BLD: 2.5 K/UL (ref 1–5.1)
LYMPHOCYTES NFR BLD: 39.3 %
MCH RBC QN AUTO: 32.7 PG (ref 26–34)
MCHC RBC AUTO-ENTMCNC: 33.6 G/DL (ref 31–36)
MCV RBC AUTO: 97.4 FL (ref 80–100)
MONOCYTES # BLD: 0.4 K/UL (ref 0–1.3)
MONOCYTES NFR BLD: 7.2 %
NEUTROPHILS # BLD: 3 K/UL (ref 1.7–7.7)
NEUTROPHILS NFR BLD: 48.3 %
PLATELET # BLD AUTO: 267 K/UL (ref 135–450)
PMV BLD AUTO: 6.8 FL (ref 5–10.5)
POTASSIUM SERPL-SCNC: 4.1 MMOL/L (ref 3.5–5.1)
RBC # BLD AUTO: 4.33 M/UL (ref 4.2–5.9)
SODIUM SERPL-SCNC: 141 MMOL/L (ref 136–145)
TRIGL SERPL-MCNC: 121 MG/DL (ref 0–150)
VLDLC SERPL CALC-MCNC: 24 MG/DL
WBC # BLD AUTO: 6.2 K/UL (ref 4–11)

## 2024-12-20 PROCEDURE — 96372 THER/PROPH/DIAG INJ SC/IM: CPT

## 2024-12-20 PROCEDURE — 83036 HEMOGLOBIN GLYCOSYLATED A1C: CPT

## 2024-12-20 PROCEDURE — 80061 LIPID PANEL: CPT

## 2024-12-20 PROCEDURE — 6360000002 HC RX W HCPCS: Performed by: NURSE PRACTITIONER

## 2024-12-20 PROCEDURE — 6370000000 HC RX 637 (ALT 250 FOR IP): Performed by: FAMILY MEDICINE

## 2024-12-20 PROCEDURE — 36415 COLL VENOUS BLD VENIPUNCTURE: CPT

## 2024-12-20 PROCEDURE — 80048 BASIC METABOLIC PNL TOTAL CA: CPT

## 2024-12-20 PROCEDURE — G0378 HOSPITAL OBSERVATION PER HR: HCPCS

## 2024-12-20 PROCEDURE — 6370000000 HC RX 637 (ALT 250 FOR IP): Performed by: NURSE PRACTITIONER

## 2024-12-20 PROCEDURE — 85025 COMPLETE CBC W/AUTO DIFF WBC: CPT

## 2024-12-20 PROCEDURE — 6370000000 HC RX 637 (ALT 250 FOR IP)

## 2024-12-20 PROCEDURE — 92610 EVALUATE SWALLOWING FUNCTION: CPT

## 2024-12-20 PROCEDURE — 2500000003 HC RX 250 WO HCPCS: Performed by: NURSE PRACTITIONER

## 2024-12-20 PROCEDURE — 99233 SBSQ HOSP IP/OBS HIGH 50: CPT | Performed by: PSYCHIATRY & NEUROLOGY

## 2024-12-20 RX ORDER — CLONAZEPAM 1 MG/1
1 TABLET ORAL EVERY 12 HOURS PRN
Status: DISCONTINUED | OUTPATIENT
Start: 2024-12-20 | End: 2024-12-20 | Stop reason: HOSPADM

## 2024-12-20 RX ORDER — ATORVASTATIN CALCIUM 40 MG/1
40 TABLET, FILM COATED ORAL NIGHTLY
Qty: 30 TABLET | Refills: 1 | Status: SHIPPED | OUTPATIENT
Start: 2024-12-20

## 2024-12-20 RX ORDER — CLONAZEPAM 1 MG/1
1 TABLET ORAL 2 TIMES DAILY PRN
Qty: 30 TABLET | Refills: 0 | Status: SHIPPED | OUTPATIENT
Start: 2024-12-20 | End: 2025-01-19

## 2024-12-20 RX ADMIN — Medication 10 ML: at 09:25

## 2024-12-20 RX ADMIN — ASPIRIN 81 MG: 81 TABLET, CHEWABLE ORAL at 09:25

## 2024-12-20 RX ADMIN — LAMOTRIGINE 100 MG: 100 TABLET ORAL at 09:24

## 2024-12-20 RX ADMIN — Medication 3 MG: at 01:09

## 2024-12-20 RX ADMIN — LAMOTRIGINE 100 MG: 100 TABLET ORAL at 13:54

## 2024-12-20 RX ADMIN — ENOXAPARIN SODIUM 40 MG: 100 INJECTION SUBCUTANEOUS at 09:25

## 2024-12-20 NOTE — PROGRESS NOTES
Shift report given and care transferred to Mara, RN pt resting comfortably, call light in reach, denies any needs at this time.

## 2024-12-20 NOTE — PLAN OF CARE
Problem: Discharge Planning  Goal: Discharge to home or other facility with appropriate resources  12/20/2024 1053 by Jaqueline Waldron RN  Outcome: Progressing  Flowsheets (Taken 12/20/2024 0922)  Discharge to home or other facility with appropriate resources:   Identify barriers to discharge with patient and caregiver   Arrange for needed discharge resources and transportation as appropriate   Identify discharge learning needs (meds, wound care, etc)   Arrange for interpreters to assist at discharge as needed   Refer to discharge planning if patient needs post-hospital services based on physician order or complex needs related to functional status, cognitive ability or social support system  12/19/2024 2355 by Phoebe Johnson RN  Outcome: Progressing     Problem: Safety - Adult  Goal: Free from fall injury  12/20/2024 1053 by Jaqueline Waldron RN  Outcome: Progressing  12/19/2024 2355 by Phoebe Johnson RN  Outcome: Progressing     Problem: Infection - Adult  Goal: Absence of infection at discharge  12/20/2024 1053 by Jaqueline Waldron RN  Outcome: Progressing  Flowsheets (Taken 12/20/2024 0922)  Absence of infection at discharge:   Assess and monitor for signs and symptoms of infection   Monitor lab/diagnostic results   Monitor all insertion sites i.e., indwelling lines, tubes and drains   Monitor endotracheal (as able) and nasal secretions for changes in amount and color   [place] appropriate cooling/warming therapies per order   Administer medications as ordered   Instruct and encourage patient and family to use good hand hygiene technique   Identify and instruct in appropriate isolation precautions for identified infection/condition  12/19/2024 2355 by Phoebe Johnson RN  Outcome: Progressing     Problem: Respiratory - Adult  Goal: Achieves optimal ventilation and oxygenation  12/20/2024 1053 by Jaqueline Waldron RN  Outcome: Progressing  Flowsheets (Taken 12/20/2024 0922)  Achieves  optimal ventilation and oxygenation:   Assess for changes in respiratory status   Assess for changes in mentation and behavior   Position to facilitate oxygenation and minimize respiratory effort   Oxygen supplementation based on oxygen saturation or arterial blood gases   Initiate smoking cessation protocol as indicated   Encourage broncho-pulmonary hygiene including cough, deep breathe, incentive spirometry   Assess the need for suctioning and aspirate as needed   Assess and instruct to report shortness of breath or any respiratory difficulty   Respiratory therapy support as indicated  12/19/2024 2355 by Phoebe Johnson RN  Outcome: Progressing     Problem: Cardiovascular - Adult  Goal: Absence of cardiac dysrhythmias or at baseline  12/20/2024 1053 by Jaqueline Waldron, RN  Outcome: Progressing  Flowsheets (Taken 12/20/2024 0922)  Absence of cardiac dysrhythmias or at baseline:   Monitor cardiac rate and rhythm   Assess for signs of decreased cardiac output   Administer antiarrhythmia medication and electrolyte replacement as ordered  12/19/2024 2355 by Phoebe Johnson, RN  Outcome: Progressing     Problem: Neurosensory - Adult  Goal: Achieves stable or improved neurological status  12/20/2024 1053 by Jaqueline Waldron, RN  Outcome: Progressing  Flowsheets (Taken 12/20/2024 0922)  Achieves stable or improved neurological status:   Assess for and report changes in neurological status   Initiate measures to prevent increased intracranial pressure   Maintain blood pressure and fluid volume within ordered parameters to optimize cerebral perfusion and minimize risk of hemorrhage   Monitor temperature, glucose, and sodium. Initiate appropriate interventions as ordered  12/19/2024 2355 by Phoebe Johnson, RN  Outcome: Progressing     Problem: Metabolic/Fluid and Electrolytes - Adult  Goal: Electrolytes maintained within normal limits  12/20/2024 1053 by Jqaueline Waldron, RN  Outcome:

## 2024-12-20 NOTE — FLOWSHEET NOTE
12/20/24 0527   Vital Signs   Temp 97.6 °F (36.4 °C)   Temp Source Oral   Pulse 59   Heart Rate Source Monitor   Respirations 18   /68   MAP (Calculated) 81   BP Location Left upper arm   Pain Assessment   Pain Assessment None - Denies Pain   Opioid-Induced Sedation   POSS Score 1   Oxygen Therapy   SpO2 97 %   O2 Device None (Room air)     Stroke scale remains at 0. NO change from earlier assessment. NO needs voiced. Phoebe Johnson RN

## 2024-12-20 NOTE — PROGRESS NOTES
Patient given discharge instructions both verbally and written along with follow up appointments and hard copy of new medication purposes and side effects, patient expressed full understanding. All questions answered. Awaiting ride home.

## 2024-12-20 NOTE — FLOWSHEET NOTE
12/19/24 2105   Vital Signs   Temp 98.2 °F (36.8 °C)   Temp Source Oral   Pulse 65   Heart Rate Source Monitor   Respirations 16   /75   MAP (Calculated) 94   BP Location Left upper arm   BP Method Automatic   Patient Position Semi fowlers   Pain Assessment   Pain Assessment None - Denies Pain   Opioid-Induced Sedation   POSS Score 1   Oxygen Therapy   SpO2 98 %   O2 Device None (Room air)     Pt back from MRI. Stroke scale remains unchanged at 0. Telemetry placed on pt. Phoebe Johnson RN

## 2024-12-20 NOTE — PLAN OF CARE
Problem: Discharge Planning  Goal: Discharge to home or other facility with appropriate resources  12/20/2024 1310 by Jaqueline Waldron RN  Outcome: Completed  12/20/2024 1053 by Jaqueline Waldron RN  Outcome: Progressing  Flowsheets (Taken 12/20/2024 0922)  Discharge to home or other facility with appropriate resources:   Identify barriers to discharge with patient and caregiver   Arrange for needed discharge resources and transportation as appropriate   Identify discharge learning needs (meds, wound care, etc)   Arrange for interpreters to assist at discharge as needed   Refer to discharge planning if patient needs post-hospital services based on physician order or complex needs related to functional status, cognitive ability or social support system  12/19/2024 2355 by Phoebe Johnson RN  Outcome: Progressing     Problem: Safety - Adult  Goal: Free from fall injury  12/20/2024 1310 by Jaqueline Waldron RN  Outcome: Completed  12/20/2024 1053 by Jaqueline Waldron RN  Outcome: Progressing  12/19/2024 2355 by Phoebe Johnson RN  Outcome: Progressing     Problem: Infection - Adult  Goal: Absence of infection at discharge  12/20/2024 1310 by Jaqueline Waldron RN  Outcome: Completed  12/20/2024 1053 by Jaqueline Waldron RN  Outcome: Progressing  Flowsheets (Taken 12/20/2024 0922)  Absence of infection at discharge:   Assess and monitor for signs and symptoms of infection   Monitor lab/diagnostic results   Monitor all insertion sites i.e., indwelling lines, tubes and drains   Monitor endotracheal (as able) and nasal secretions for changes in amount and color   Vancleve appropriate cooling/warming therapies per order   Administer medications as ordered   Instruct and encourage patient and family to use good hand hygiene technique   Identify and instruct in appropriate isolation precautions for identified infection/condition  12/19/2024 2355 by Phoebe Johnson RN  Outcome: Progressing

## 2024-12-20 NOTE — PROGRESS NOTES
Speech Language Pathology  Swallowing Disorders and Dysphagia  Clinical Bedside Swallow Assessment  Facility/Department: [address] MEDICAL-SURGICAL    Instrumentation: Not clinically indicated at this time  Diet recommendation: IDDSI 7 Regular Solids; IDDSI 0 Thin Liquids; Meds whole with thin liquids  Risk management: upright for all intake, oral care 2-3x/day to reduce adverse affects in the event of aspiration, increase physical mobility as able, slow rate of intake, general aspiration precautions, and hold PO and contact SLP if s/s of aspiration or worsening respiratory status develop.    **Although current clinical presentation appears grossly WFL, POC ensures pt is followed 1-3x/wk given nature of potential neurological deficits and risk of acute change in status.      NAME:Josiah Barger  : 1955 (69 y.o.)   MRN: [id number]  ROOM: 0233/[id number]  ADMISSION DATE: 2024  Chief Complaint   Patient presents with    Numbness     Pt reports feeling \"like crud\" for last few days, pt reports starting this am about 0930 his left toes started feeling tingles and then numbness to left leg, left arm, and left side of face     Past Medical History:   Diagnosis Date    Abdominal pain     COVID-19     Degenerative disc disease     Hyperlipidemia     Hypertension      Past Surgical History:   Procedure Laterality Date    EYE SURGERY Left     cataract    HERNIA REPAIR           DATE ONSET: 2024    Date of Evaluation: 2024   Evaluating Therapist: JOSE Faulkner    Chart Reviewed: : [x] Yes [] No     Pain: The patient does not complain of pain    RN Ok'd SLP Entry: Yes [x]  Requested Hold []  RN Name: Jaqueline      Data Review:        Current Diet: ADULT DIET; Regular    Lab Values:    CBC:  Lab Results   Component Value Date    WBC 6.2 2024    HGB 14.2 2024    HCT 42.2 2024    MCV 97.4 2024     2024       Basic Metabolic Panel  Lab Results   Component Value  https://www.fda.gov/media/124031/download  Provider Fact Sheet: https://www.fda.gov/media/537967/download  EUA: https://www.fda.gov/media/687815/download  IFU: https://www.fda.gov/media/318385/download    Methodology:  RT-PCR          Influenza A NOT DETECTED     Comment: Note:  Influenza A and Influenza B negative results should be considered  presumptive in samples that have a Detected SARS-CoV-2 result.  Consider  re-testing with an alternate FDA-approved test for Flu A & B if clinically  indicated.          Influenza B NOT DETECTED     Comment: Note:  Influenza A and Influenza B negative results should be considered  presumptive in samples that have a Detected SARS-CoV-2 result.  Consider  re-testing with an alternate FDA-approved test for Flu A & B if clinically  indicated.                  Recently Recorded Vitals    Vitals:    12/19/24 2105 12/20/24 0058 12/20/24 0527 12/20/24 0915   BP: 132/75 120/73 108/68 110/70   Pulse: 65 59 59 89   Resp: 16 16 18 18   Temp: 98.2 °F (36.8 °C) 97.5 °F (36.4 °C) 97.6 °F (36.4 °C) 97.7 °F (36.5 °C)   TempSrc: Oral Oral Oral Oral   SpO2: 98% 97% 97% 94%   Weight:       Height:            Most Recent Imaging    MRI brain without contrast   Final Result   No acute intracranial abnormality.         CTA HEAD NECK W CONTRAST   Final Result   No large vessel occlusion in the head or neck.         CT HEAD WO CONTRAST   Final Result   1. No acute intracranial abnormality.   Findings were discussed with OTILIO BOO at 10:39 am on 12/19/2024.               Meds    No current facility-administered medications on file prior to encounter.     Current Outpatient Medications on File Prior to Encounter   Medication Sig Dispense Refill    LORazepam (ATIVAN) 0.5 MG tablet Take 1 tablet by mouth every 6 hours as needed for Anxiety. Max Daily Amount: 2 mg      lamoTRIgine (LAMICTAL) 100 MG tablet TAKE 1 TABLET BY MOUTH 3 TIMES A DAY IN THE MORNING, AT NOON, AND AT BEDTIME 90 tablet 3

## 2024-12-20 NOTE — PLAN OF CARE
SLP completed evaluation. Please refer to notes in EMR.    Linda Simmons MA CF-SLP   Speech Language Pathologist

## 2024-12-20 NOTE — PROGRESS NOTES
IM Progress Note    Admit Date:  12/19/2024    Subjective:  Mr. Barger ***    Objective:   /70   Pulse 89   Temp 97.7 °F (36.5 °C) (Oral)   Resp 18   Ht 1.727 m (5' 8\")   Wt 72.7 kg (160 lb 3.2 oz)   SpO2 94%   BMI 24.36 kg/m²     Intake/Output Summary (Last 24 hours) at 12/20/2024 1147  Last data filed at 12/20/2024 0921  Gross per 24 hour   Intake 720 ml   Output --   Net 720 ml         Physical Exam:  General:  Awake, alert, NAD  Skin:  Warm and dry  Neck:  JVD absent. Neck supple  Chest:  Clear to auscultation, respiration easy. No wheezes, rales or rhonchi.   Cardiovascular:  RRR ,S1S2 normal  Abdomen:  Soft, non tender, non distended, BS +  Extremities:  No edema.  Intact peripheral pulses. Brisk cap refill, < 2 secs  Neuro: non focal      Medications:   Scheduled Meds:   sodium chloride flush  5-40 mL IntraVENous 2 times per day    enoxaparin  40 mg SubCUTAneous Daily    aspirin  81 mg Oral Daily    Or    aspirin  300 mg Rectal Daily    atorvastatin  80 mg Oral Nightly    lamoTRIgine  100 mg Oral TID       Continuous Infusions:   sodium chloride         Data:  CBC:   Recent Labs     12/19/24  1033 12/20/24  0518   WBC 5.1 6.2   RBC 4.40 4.33   HGB 14.4 14.2   HCT 42.5 42.2   MCV 96.6 97.4   RDW 13.7 13.9    267     BMP:   Recent Labs     12/19/24  1033 12/20/24  0518    141   K 4.3 4.1    107   CO2 27 22   BUN 13 16   CREATININE 0.9 1.0     BNP: No results for input(s): \"BNP\" in the last 72 hours.  PT/INR:   Recent Labs     12/19/24  1033   PROTIME 13.0   INR 0.96     APTT: No results for input(s): \"APTT\" in the last 72 hours.  CARDIAC ENZYMES: No results for input(s): \"CKMB\", \"CKMBINDEX\", \"TROPONINI\" in the last 72 hours.    Invalid input(s): \"CKTOTAL;3\"  FASTING LIPID PANEL:  Lab Results   Component Value Date    CHOL 222 (H) 12/20/2024    HDL 43 12/20/2024    TRIG 121 12/20/2024     LIVER PROFILE:   Recent Labs     12/19/24  1033   AST 14*   ALT 11   BILITOT 0.5   ALKPHOS

## 2024-12-20 NOTE — PROGRESS NOTES
Neurology Progress Note    ID: Josiah Barger is a 69 y.o. male    : 1955     LOS: 0 days     ASSESSMENT    Principal Problem:    TIA (transient ischemic attack)  Active Problems:    Seizure (HCC)    COVID    Paresthesias    Hx of transient ischemic attack (TIA)  Resolved Problems:    * No resolved hospital problems. *    The examination showed no focal neurological deficit.  The CT brain showed no acute ischemic injury or intracerebral hemorrhage.  The CTA of head and neck showed no significant hemodynamic stenosis.  MRI brain showed no acute ischemic injury.     Based on the history, this is breakthrough seizure in the context of COVID-19 infection.    24: The patient is back to his baseline.  The MRI brain did not  any acute ischemic injury.     Plan:     1.  Clonazepam 1 mg as needed for breakthrough seizure.  2.  Continue lamotrigine 100 mg 3 times a day.  3.  Seizure precaution.  4.  The target blood pressure below than 140/90.    The patient can be discharged with clonazepam as needed for the spell.  Neurology will sign off.    Medications:  Scheduled Meds:    sodium chloride flush  5-40 mL IntraVENous 2 times per day    enoxaparin  40 mg SubCUTAneous Daily    aspirin  81 mg Oral Daily    Or    aspirin  300 mg Rectal Daily    atorvastatin  80 mg Oral Nightly    lamoTRIgine  100 mg Oral TID     Continuous Infusions:    sodium chloride       PRN Meds: melatonin, sodium chloride flush, sodium chloride, ondansetron **OR** ondansetron, polyethylene glycol    OBJECTIVE  Vital signs in the last 24 hours:  Vitals:    24 0915   BP: 110/70   Pulse: 89   Resp: 18   Temp: 97.7 °F (36.5 °C)   SpO2: 94%       Intake/Output last 3 shifts:  I/O last 3 completed shifts:  In: 480 [P.O.:480]  Out: -     Intake/Output this shift:  I/O this shift:  In: 240 [P.O.:240]  Out: -     Yesterday's Weight:  Wt Readings from Last 1 Encounters:   24 72.7 kg (160 lb 3.2 oz)       SUBJECTIVE  There was no

## 2024-12-20 NOTE — PLAN OF CARE
Problem: Discharge Planning  Goal: Discharge to home or other facility with appropriate resources  12/19/2024 2355 by Phoebe Johnson RN  Outcome: Progressing  12/19/2024 1518 by Veena Lepe RN  Outcome: Progressing     Problem: Safety - Adult  Goal: Free from fall injury  12/19/2024 2355 by Phoebe Johnson RN  Outcome: Progressing  12/19/2024 1518 by Veena Lepe RN  Outcome: Progressing     Problem: Infection - Adult  Goal: Absence of infection at discharge  Outcome: Progressing

## 2024-12-20 NOTE — FLOWSHEET NOTE
12/20/24 0915   Vital Signs   Temp 97.7 °F (36.5 °C)   Temp Source Oral   Pulse 89   Heart Rate Source Monitor   Respirations 18   /70   MAP (Calculated) 83   Pain Assessment   Pain Assessment None - Denies Pain   Opioid-Induced Sedation   POSS Score 1   Oxygen Therapy   SpO2 94 %   O2 Device None (Room air)     Patient sitting up in bed, fed self 100% breakfast, denies complains of. Lung sounds CTA, states he is ready to go home. On room air at 94%. Skin pink warm and dry. Voiding clear kenya urine. Will continue to monitor. Bed in low position, call bell and bedside table within reach.

## 2024-12-20 NOTE — FLOWSHEET NOTE
12/20/24 0058   Vital Signs   Temp 97.5 °F (36.4 °C)   Temp Source Oral   Pulse 59   Heart Rate Source Monitor   Respirations 16   /73   MAP (Calculated) 89   BP Location Left upper arm   Pain Assessment   Pain Assessment None - Denies Pain   Opioid-Induced Sedation   POSS Score 1   Oxygen Therapy   SpO2 97 %   O2 Device None (Room air)     Stroke scale completed. Remains 0 at baseline. Pt asking for melatonin for sleep. Perfectserve sent to hospitalist on call. Phoebe Johnson RN

## 2024-12-21 LAB
EST. AVERAGE GLUCOSE BLD GHB EST-MCNC: 108.3 MG/DL
HBA1C MFR BLD: 5.4 %

## 2024-12-25 ENCOUNTER — HOSPITAL ENCOUNTER (EMERGENCY)
Age: 69
Discharge: HOME | End: 2024-12-25
Payer: MEDICARE

## 2024-12-25 VITALS
OXYGEN SATURATION: 100 % | SYSTOLIC BLOOD PRESSURE: 127 MMHG | HEART RATE: 64 BPM | HEIGHT: 68 IN | WEIGHT: 160 LBS | BODY MASS INDEX: 24.25 KG/M2 | TEMPERATURE: 97.9 F | DIASTOLIC BLOOD PRESSURE: 82 MMHG | RESPIRATION RATE: 16 BRPM

## 2024-12-25 DIAGNOSIS — M79.605 LEFT LEG PAIN: Primary | ICD-10-CM

## 2024-12-25 LAB
ALBUMIN SERPL-MCNC: 4 G/DL (ref 3.4–5)
ALBUMIN/GLOB SERPL: 1.3 {RATIO} (ref 1.1–2.2)
ALP SERPL-CCNC: 67 U/L (ref 40–129)
ALT SERPL-CCNC: 13 U/L (ref 10–40)
ANION GAP SERPL CALCULATED.3IONS-SCNC: 8 MMOL/L (ref 3–16)
AST SERPL-CCNC: 16 U/L (ref 15–37)
BASOPHILS # BLD: 0 K/UL (ref 0–0.2)
BASOPHILS NFR BLD: 1.1 %
BILIRUB SERPL-MCNC: 0.4 MG/DL (ref 0–1)
BUN SERPL-MCNC: 12 MG/DL (ref 7–20)
CALCIUM SERPL-MCNC: 8.7 MG/DL (ref 8.3–10.6)
CHLORIDE SERPL-SCNC: 105 MMOL/L (ref 99–110)
CO2 SERPL-SCNC: 28 MMOL/L (ref 21–32)
CREAT SERPL-MCNC: 0.8 MG/DL (ref 0.8–1.3)
DEPRECATED RDW RBC AUTO: 13.8 % (ref 12.4–15.4)
EOSINOPHIL # BLD: 0.2 K/UL (ref 0–0.6)
EOSINOPHIL NFR BLD: 3.9 %
GFR SERPLBLD CREATININE-BSD FMLA CKD-EPI: >90 ML/MIN/{1.73_M2}
GLUCOSE SERPL-MCNC: 89 MG/DL (ref 70–99)
HCT VFR BLD AUTO: 42.7 % (ref 40.5–52.5)
HGB BLD-MCNC: 14.5 G/DL (ref 13.5–17.5)
INR PPP: 0.99 (ref 0.85–1.15)
LYMPHOCYTES # BLD: 1.4 K/UL (ref 1–5.1)
LYMPHOCYTES NFR BLD: 30.4 %
MCH RBC QN AUTO: 32.7 PG (ref 26–34)
MCHC RBC AUTO-ENTMCNC: 33.9 G/DL (ref 31–36)
MCV RBC AUTO: 96.3 FL (ref 80–100)
MONOCYTES # BLD: 0.3 K/UL (ref 0–1.3)
MONOCYTES NFR BLD: 7.1 %
NEUTROPHILS # BLD: 2.6 K/UL (ref 1.7–7.7)
NEUTROPHILS NFR BLD: 57.5 %
PLATELET # BLD AUTO: 250 K/UL (ref 135–450)
PMV BLD AUTO: 6.9 FL (ref 5–10.5)
POTASSIUM SERPL-SCNC: 4.5 MMOL/L (ref 3.5–5.1)
PROT SERPL-MCNC: 7 G/DL (ref 6.4–8.2)
PROTHROMBIN TIME: 13.3 SEC (ref 11.9–14.9)
RBC # BLD AUTO: 4.43 M/UL (ref 4.2–5.9)
SODIUM SERPL-SCNC: 141 MMOL/L (ref 136–145)
WBC # BLD AUTO: 4.6 K/UL (ref 4–11)

## 2024-12-25 PROCEDURE — 85025 COMPLETE CBC W/AUTO DIFF WBC: CPT

## 2024-12-25 PROCEDURE — 99283 EMERGENCY DEPT VISIT LOW MDM: CPT

## 2024-12-25 PROCEDURE — 80053 COMPREHEN METABOLIC PANEL: CPT

## 2024-12-25 PROCEDURE — 36415 COLL VENOUS BLD VENIPUNCTURE: CPT

## 2024-12-25 PROCEDURE — 6370000000 HC RX 637 (ALT 250 FOR IP): Performed by: NURSE PRACTITIONER

## 2024-12-25 PROCEDURE — 85610 PROTHROMBIN TIME: CPT

## 2024-12-25 RX ADMIN — APIXABAN 10 MG: 5 TABLET, FILM COATED ORAL at 12:32

## 2024-12-25 ASSESSMENT — PAIN - FUNCTIONAL ASSESSMENT: PAIN_FUNCTIONAL_ASSESSMENT: 0-10

## 2024-12-25 ASSESSMENT — PAIN DESCRIPTION - DESCRIPTORS: DESCRIPTORS: ACHING

## 2024-12-25 ASSESSMENT — PAIN SCALES - GENERAL: PAINLEVEL_OUTOF10: 3

## 2024-12-25 ASSESSMENT — PAIN DESCRIPTION - ORIENTATION: ORIENTATION: LEFT;LOWER

## 2024-12-25 ASSESSMENT — PAIN DESCRIPTION - LOCATION: LOCATION: LEG

## 2024-12-25 ASSESSMENT — LIFESTYLE VARIABLES: HOW MANY STANDARD DRINKS CONTAINING ALCOHOL DO YOU HAVE ON A TYPICAL DAY: PATIENT DOES NOT DRINK

## 2024-12-25 NOTE — ED PROVIDER NOTES
medications on file              (Please note that portions of this note were completed with a voice recognition program.  Efforts were made to edit the dictations but occasionally words are mis-transcribed.)    BUSHRA Luna CNP (electronically signed)      Titi Martinez APRN - CNP  12/25/24 0716

## 2024-12-27 ENCOUNTER — HOSPITAL ENCOUNTER (OUTPATIENT)
Dept: VASCULAR LAB | Age: 69
Discharge: HOME | End: 2024-12-29
Payer: MEDICARE

## 2024-12-27 DIAGNOSIS — M79.605 LEFT LEG PAIN: ICD-10-CM

## 2024-12-27 PROCEDURE — 93971 EXTREMITY STUDY: CPT

## 2024-12-28 PROCEDURE — 93971 EXTREMITY STUDY: CPT | Performed by: SURGERY

## 2025-04-04 DIAGNOSIS — G40.209 PARTIAL SYMPTOMATIC EPILEPSY WITH COMPLEX PARTIAL SEIZURES, NOT INTRACTABLE, WITHOUT STATUS EPILEPTICUS: ICD-10-CM

## 2025-04-07 RX ORDER — LAMOTRIGINE 100 MG/1
TABLET ORAL
Qty: 90 TABLET | Refills: 5 | Status: SHIPPED | OUTPATIENT
Start: 2025-04-07

## 2025-04-07 NOTE — TELEPHONE ENCOUNTER
Last seen: 10/22/24    Next appt: 4/23/25 (appt will need to be cx due to provider leaving [facility])    Last filled: 12/2/24 for 30-day supply with 3 refills.     Pharmacy had to dispense emergency supply over the weekend since pt is completely out of medications.  Routing to Dr. Dawson to sign Rx in Dr. Phelps's absence.

## 2025-04-16 ENCOUNTER — HOSPITAL ENCOUNTER (OUTPATIENT)
Age: 70
Discharge: HOME | End: 2025-04-16
Payer: MEDICARE

## 2025-04-16 ENCOUNTER — HOSPITAL ENCOUNTER (OUTPATIENT)
Dept: GENERAL RADIOLOGY | Age: 70
Discharge: HOME | End: 2025-04-16
Payer: MEDICARE

## 2025-04-16 DIAGNOSIS — R07.89 OTHER CHEST PAIN: ICD-10-CM

## 2025-04-16 PROCEDURE — 71046 X-RAY EXAM CHEST 2 VIEWS: CPT

## 2025-07-07 ENCOUNTER — TRANSCRIBE ORDERS (OUTPATIENT)
Dept: ADMINISTRATIVE | Age: 70
End: 2025-07-07

## 2025-07-07 DIAGNOSIS — R07.89 INTERMITTENT RIGHT-SIDED CHEST PAIN: Primary | ICD-10-CM

## 2025-07-22 ENCOUNTER — HOSPITAL ENCOUNTER (OUTPATIENT)
Age: 70
Setting detail: OBSERVATION
Discharge: HOME | End: 2025-07-23
Attending: EMERGENCY MEDICINE | Admitting: STUDENT IN AN ORGANIZED HEALTH CARE EDUCATION/TRAINING PROGRAM
Payer: MEDICARE

## 2025-07-22 ENCOUNTER — APPOINTMENT (OUTPATIENT)
Dept: CT IMAGING | Age: 70
End: 2025-07-22
Payer: MEDICARE

## 2025-07-22 ENCOUNTER — APPOINTMENT (OUTPATIENT)
Dept: GENERAL RADIOLOGY | Age: 70
End: 2025-07-22
Payer: MEDICARE
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
## 2025-07-22 DIAGNOSIS — S09.90XA CLOSED HEAD INJURY, INITIAL ENCOUNTER: ICD-10-CM

## 2025-07-22 DIAGNOSIS — R42 DIZZINESS: Primary | ICD-10-CM

## 2025-07-22 LAB
ALBUMIN SERPL-MCNC: 4.2 G/DL (ref 3.4–5)
ALBUMIN/GLOB SERPL: 1.7 {RATIO} (ref 1.1–2.2)
ALP SERPL-CCNC: 78 U/L (ref 40–129)
ALT SERPL-CCNC: 11 U/L (ref 10–40)
ANION GAP SERPL CALCULATED.3IONS-SCNC: 12 MMOL/L (ref 3–16)
AST SERPL-CCNC: 13 U/L (ref 15–37)
BASE EXCESS BLDV CALC-SCNC: 1.4 MMOL/L (ref -3–3)
BASOPHILS # BLD: 0 K/UL (ref 0–0.2)
BASOPHILS NFR BLD: 0.8 %
BILIRUB SERPL-MCNC: 0.4 MG/DL (ref 0–1)
BILIRUB UR QL STRIP.AUTO: NEGATIVE
BUN SERPL-MCNC: 11 MG/DL (ref 7–20)
CALCIUM SERPL-MCNC: 9.7 MG/DL (ref 8.3–10.6)
CHLORIDE SERPL-SCNC: 103 MMOL/L (ref 99–110)
CLARITY UR: CLEAR
CO2 BLDV-SCNC: 29 MMOL/L
CO2 SERPL-SCNC: 25 MMOL/L (ref 21–32)
COHGB MFR BLDV: 1.8 % (ref 0–1.5)
COLOR UR: YELLOW
CREAT SERPL-MCNC: 0.9 MG/DL (ref 0.8–1.3)
DEPRECATED RDW RBC AUTO: 13.6 % (ref 12.4–15.4)
EKG ATRIAL RATE: 62 BPM
EKG DIAGNOSIS: NORMAL
EKG P AXIS: 50 DEGREES
EKG P-R INTERVAL: 164 MS
EKG Q-T INTERVAL: 400 MS
EKG QRS DURATION: 114 MS
EKG QTC CALCULATION (BAZETT): 406 MS
EKG R AXIS: -43 DEGREES
EKG T AXIS: 50 DEGREES
EKG VENTRICULAR RATE: 62 BPM
EOSINOPHIL # BLD: 0.1 K/UL (ref 0–0.6)
EOSINOPHIL NFR BLD: 0.9 %
GFR SERPLBLD CREATININE-BSD FMLA CKD-EPI: >90 ML/MIN/{1.73_M2}
GLUCOSE BLD-MCNC: 104 MG/DL
GLUCOSE BLD-MCNC: 104 MG/DL (ref 70–99)
GLUCOSE SERPL-MCNC: 103 MG/DL (ref 70–99)
GLUCOSE UR STRIP.AUTO-MCNC: NEGATIVE MG/DL
HCO3 BLDV-SCNC: 27.7 MMOL/L (ref 23–29)
HCT VFR BLD AUTO: 44.9 % (ref 40.5–52.5)
HGB BLD-MCNC: 15.4 G/DL (ref 13.5–17.5)
HGB UR QL STRIP.AUTO: NEGATIVE
INR PPP: 0.94 (ref 0.86–1.14)
KETONES UR STRIP.AUTO-MCNC: NEGATIVE MG/DL
LEUKOCYTE ESTERASE UR QL STRIP.AUTO: NEGATIVE
LYMPHOCYTES # BLD: 1.7 K/UL (ref 1–5.1)
LYMPHOCYTES NFR BLD: 26.5 %
MCH RBC QN AUTO: 32.7 PG (ref 26–34)
MCHC RBC AUTO-ENTMCNC: 34.3 G/DL (ref 31–36)
MCV RBC AUTO: 95.5 FL (ref 80–100)
METHGB MFR BLDV: 0.3 %
MONOCYTES # BLD: 0.4 K/UL (ref 0–1.3)
MONOCYTES NFR BLD: 6.5 %
NEUTROPHILS # BLD: 4.1 K/UL (ref 1.7–7.7)
NEUTROPHILS NFR BLD: 65.3 %
NITRITE UR QL STRIP.AUTO: NEGATIVE
NT-PROBNP SERPL-MCNC: <36 PG/ML (ref 0–124)
O2 THERAPY: ABNORMAL
PCO2 BLDV: 49.3 MMHG (ref 40–50)
PERFORMED ON: ABNORMAL
PH BLDV: 7.37 [PH] (ref 7.35–7.45)
PH UR STRIP.AUTO: 8 [PH] (ref 5–8)
PLATELET # BLD AUTO: 292 K/UL (ref 135–450)
PMV BLD AUTO: 6.7 FL (ref 5–10.5)
PO2 BLDV: 29.3 MMHG (ref 25–40)
POTASSIUM SERPL-SCNC: 4.2 MMOL/L (ref 3.5–5.1)
PROT SERPL-MCNC: 6.7 G/DL (ref 6.4–8.2)
PROT UR STRIP.AUTO-MCNC: NEGATIVE MG/DL
PROTHROMBIN TIME: 12.9 SEC (ref 12.1–14.9)
RBC # BLD AUTO: 4.7 M/UL (ref 4.2–5.9)
RBC #/AREA URNS HPF: NORMAL /HPF (ref 0–4)
SAO2 % BLDV: 53 %
SODIUM SERPL-SCNC: 140 MMOL/L (ref 136–145)
SP GR UR STRIP.AUTO: 1.01 (ref 1–1.03)
TROPONIN, HIGH SENSITIVITY: <6 NG/L (ref 0–22)
UA DIPSTICK W REFLEX MICRO PNL UR: NORMAL
URN SPEC COLLECT METH UR: NORMAL
UROBILINOGEN UR STRIP-ACNC: 0.2 E.U./DL
WBC # BLD AUTO: 6.2 K/UL (ref 4–11)
WBC #/AREA URNS HPF: NORMAL /HPF (ref 0–5)

## 2025-07-22 PROCEDURE — 82803 BLOOD GASES ANY COMBINATION: CPT

## 2025-07-22 PROCEDURE — 6360000002 HC RX W HCPCS: Performed by: INTERNAL MEDICINE

## 2025-07-22 PROCEDURE — 6370000000 HC RX 637 (ALT 250 FOR IP): Performed by: PHYSICIAN ASSISTANT

## 2025-07-22 PROCEDURE — 83880 ASSAY OF NATRIURETIC PEPTIDE: CPT

## 2025-07-22 PROCEDURE — 2580000003 HC RX 258: Performed by: INTERNAL MEDICINE

## 2025-07-22 PROCEDURE — 85025 COMPLETE CBC W/AUTO DIFF WBC: CPT

## 2025-07-22 PROCEDURE — 85610 PROTHROMBIN TIME: CPT

## 2025-07-22 PROCEDURE — 2500000003 HC RX 250 WO HCPCS: Performed by: INTERNAL MEDICINE

## 2025-07-22 PROCEDURE — 93010 ELECTROCARDIOGRAM REPORT: CPT | Performed by: INTERNAL MEDICINE

## 2025-07-22 PROCEDURE — 87086 URINE CULTURE/COLONY COUNT: CPT

## 2025-07-22 PROCEDURE — 6370000000 HC RX 637 (ALT 250 FOR IP): Performed by: INTERNAL MEDICINE

## 2025-07-22 PROCEDURE — 99285 EMERGENCY DEPT VISIT HI MDM: CPT

## 2025-07-22 PROCEDURE — 81001 URINALYSIS AUTO W/SCOPE: CPT

## 2025-07-22 PROCEDURE — 80053 COMPREHEN METABOLIC PANEL: CPT

## 2025-07-22 PROCEDURE — 1200000000 HC SEMI PRIVATE

## 2025-07-22 PROCEDURE — 71045 X-RAY EXAM CHEST 1 VIEW: CPT

## 2025-07-22 PROCEDURE — 70496 CT ANGIOGRAPHY HEAD: CPT

## 2025-07-22 PROCEDURE — 6360000004 HC RX CONTRAST MEDICATION: Performed by: PHYSICIAN ASSISTANT

## 2025-07-22 PROCEDURE — 96372 THER/PROPH/DIAG INJ SC/IM: CPT

## 2025-07-22 PROCEDURE — 93005 ELECTROCARDIOGRAM TRACING: CPT | Performed by: PHYSICIAN ASSISTANT

## 2025-07-22 PROCEDURE — 70450 CT HEAD/BRAIN W/O DYE: CPT

## 2025-07-22 PROCEDURE — 84484 ASSAY OF TROPONIN QUANT: CPT

## 2025-07-22 RX ORDER — SODIUM CHLORIDE 0.9 % (FLUSH) 0.9 %
5-40 SYRINGE (ML) INJECTION EVERY 12 HOURS SCHEDULED
Status: DISCONTINUED | OUTPATIENT
Start: 2025-07-22 | End: 2025-07-23 | Stop reason: HOSPADM

## 2025-07-22 RX ORDER — IOPAMIDOL 755 MG/ML
75 INJECTION, SOLUTION INTRAVASCULAR
Status: COMPLETED | OUTPATIENT
Start: 2025-07-22 | End: 2025-07-22

## 2025-07-22 RX ORDER — LAMOTRIGINE 100 MG/1
100 TABLET ORAL 3 TIMES DAILY
Status: DISCONTINUED | OUTPATIENT
Start: 2025-07-22 | End: 2025-07-23 | Stop reason: HOSPADM

## 2025-07-22 RX ORDER — SODIUM CHLORIDE 9 MG/ML
INJECTION, SOLUTION INTRAVENOUS CONTINUOUS
Status: DISCONTINUED | OUTPATIENT
Start: 2025-07-22 | End: 2025-07-23 | Stop reason: HOSPADM

## 2025-07-22 RX ORDER — ONDANSETRON 4 MG/1
4 TABLET, ORALLY DISINTEGRATING ORAL EVERY 8 HOURS PRN
Status: DISCONTINUED | OUTPATIENT
Start: 2025-07-22 | End: 2025-07-23 | Stop reason: HOSPADM

## 2025-07-22 RX ORDER — ACETAMINOPHEN 325 MG/1
650 TABLET ORAL EVERY 6 HOURS PRN
Status: DISCONTINUED | OUTPATIENT
Start: 2025-07-22 | End: 2025-07-23 | Stop reason: HOSPADM

## 2025-07-22 RX ORDER — POTASSIUM CHLORIDE 7.45 MG/ML
10 INJECTION INTRAVENOUS PRN
Status: DISCONTINUED | OUTPATIENT
Start: 2025-07-22 | End: 2025-07-23 | Stop reason: HOSPADM

## 2025-07-22 RX ORDER — ONDANSETRON 2 MG/ML
4 INJECTION INTRAMUSCULAR; INTRAVENOUS EVERY 6 HOURS PRN
Status: DISCONTINUED | OUTPATIENT
Start: 2025-07-22 | End: 2025-07-23 | Stop reason: HOSPADM

## 2025-07-22 RX ORDER — ACETAMINOPHEN 650 MG/1
650 SUPPOSITORY RECTAL EVERY 6 HOURS PRN
Status: DISCONTINUED | OUTPATIENT
Start: 2025-07-22 | End: 2025-07-23 | Stop reason: HOSPADM

## 2025-07-22 RX ORDER — ENOXAPARIN SODIUM 100 MG/ML
40 INJECTION SUBCUTANEOUS DAILY
Status: DISCONTINUED | OUTPATIENT
Start: 2025-07-22 | End: 2025-07-23 | Stop reason: HOSPADM

## 2025-07-22 RX ORDER — POLYETHYLENE GLYCOL 3350 17 G/17G
17 POWDER, FOR SOLUTION ORAL DAILY PRN
Status: DISCONTINUED | OUTPATIENT
Start: 2025-07-22 | End: 2025-07-23 | Stop reason: HOSPADM

## 2025-07-22 RX ORDER — OMEGA-3/DHA/EPA/FISH OIL 300-1000MG
2 CAPSULE ORAL 2 TIMES DAILY
Status: DISCONTINUED | OUTPATIENT
Start: 2025-07-22 | End: 2025-07-22 | Stop reason: DRUGHIGH

## 2025-07-22 RX ORDER — MECLIZINE HCL 25MG 25 MG/1
25 TABLET, CHEWABLE ORAL ONCE
Status: COMPLETED | OUTPATIENT
Start: 2025-07-22 | End: 2025-07-22

## 2025-07-22 RX ORDER — POTASSIUM CHLORIDE 1500 MG/1
40 TABLET, EXTENDED RELEASE ORAL PRN
Status: DISCONTINUED | OUTPATIENT
Start: 2025-07-22 | End: 2025-07-23 | Stop reason: HOSPADM

## 2025-07-22 RX ORDER — ASPIRIN 81 MG/1
81 TABLET ORAL DAILY
Status: DISCONTINUED | OUTPATIENT
Start: 2025-07-23 | End: 2025-07-23 | Stop reason: HOSPADM

## 2025-07-22 RX ORDER — SODIUM CHLORIDE 0.9 % (FLUSH) 0.9 %
5-40 SYRINGE (ML) INJECTION PRN
Status: DISCONTINUED | OUTPATIENT
Start: 2025-07-22 | End: 2025-07-23 | Stop reason: HOSPADM

## 2025-07-22 RX ORDER — CLONAZEPAM 1 MG/1
1 TABLET ORAL 2 TIMES DAILY PRN
Status: DISCONTINUED | OUTPATIENT
Start: 2025-07-22 | End: 2025-07-23 | Stop reason: HOSPADM

## 2025-07-22 RX ORDER — MAGNESIUM SULFATE IN WATER 40 MG/ML
2000 INJECTION, SOLUTION INTRAVENOUS PRN
Status: DISCONTINUED | OUTPATIENT
Start: 2025-07-22 | End: 2025-07-23 | Stop reason: HOSPADM

## 2025-07-22 RX ORDER — SODIUM CHLORIDE 9 MG/ML
INJECTION, SOLUTION INTRAVENOUS PRN
Status: DISCONTINUED | OUTPATIENT
Start: 2025-07-22 | End: 2025-07-23 | Stop reason: HOSPADM

## 2025-07-22 RX ADMIN — LAMOTRIGINE 100 MG: 100 TABLET ORAL at 22:07

## 2025-07-22 RX ADMIN — IOPAMIDOL 75 ML: 755 INJECTION, SOLUTION INTRAVENOUS at 16:58

## 2025-07-22 RX ADMIN — SODIUM CHLORIDE: 0.9 INJECTION, SOLUTION INTRAVENOUS at 21:47

## 2025-07-22 RX ADMIN — ENOXAPARIN SODIUM 40 MG: 100 INJECTION SUBCUTANEOUS at 21:50

## 2025-07-22 RX ADMIN — Medication 10 ML: at 21:48

## 2025-07-22 RX ADMIN — MECLIZINE HYDROCHLORIDE CHEWABLE 25 MG: 25 TABLET, CHEWABLE ORAL at 17:37

## 2025-07-22 ASSESSMENT — LIFESTYLE VARIABLES
HOW OFTEN DO YOU HAVE A DRINK CONTAINING ALCOHOL: NEVER
HOW MANY STANDARD DRINKS CONTAINING ALCOHOL DO YOU HAVE ON A TYPICAL DAY: PATIENT DOES NOT DRINK

## 2025-07-22 ASSESSMENT — PAIN - FUNCTIONAL ASSESSMENT: PAIN_FUNCTIONAL_ASSESSMENT: NONE - DENIES PAIN

## 2025-07-22 ASSESSMENT — VISUAL ACUITY: OU: 1

## 2025-07-22 NOTE — H&P
Hospital Medicine History & Physical      Date of Admission: 7/22/2025    Date of Service:  Pt seen/examined on 7/22/2025    [x]Admitted to Inpatient with expected LOS greater than two midnights due to medical therapy.  []Placed in Observation status.    Chief Admission Complaint: Fall, dizziness    Presenting Admission History:      69 y.o. male who presented to [facility] with fall and dizziness.  PMHx significant for seizure disorder, temporal arteritis, hypertension, history of TIA, hyperlipidemia.      Patient seen at bedside in the emergency department alone.  He is a poor historian.  He is alert and oriented x 3 but very unsure of history of events.  Documentation in the ED noted that patient was experiencing \"dizziness\" prior to his fall but patient unsure on my exam.  He noted that he was sitting on his couch around noon on day prior to admission and stood up abruptly and subsequently had lightheadedness and fell forward and he tried to catch himself but ended up hitting his head against the coffee table in the left upper side of his head.  This fall was unwitnessed.  He was able to get up afterwards without any issues.  He denies any prior history of lightheadedness like this before.  Said it persisted and so he came to emergency department.  Noted that it has improved a little bit since being in the hospital.    He denies any any recent illnesses, fevers or chills.  Denies any new medications or increased doses of any medications.  Denies any chest pain or shortness of breath.  His family was at home but no one was around when this happened.     In the ED, vital signs stable.  On room air.  BMP unremarkable.  Troponin is negative.  CBC unremarkable.  UA unremarkable.  Chest clear no acute process.  CT head and CTA head and neck were unremarkable.  No significant stenosis or large vessel occlusion in the head or neck.  Did show mild beading and irregularity of the bilateral cervical ICAs but

## 2025-07-22 NOTE — ED PROVIDER NOTES
cutting corners close and hitting mailboxes for the past few months, and the patient notes mood changes. On exam, he had some b/l upper extremity ataxia with finger to nose testing and a mild pill rolling tremor. No significant injuries from the fall.      Workup overall unremarkable. Given that these events occurred greater than 24 hours ago, code stroke was not activated, but CT head and CTA head and neck is performed. CT head, and CTA head and neck overall nonacute but does note mild beading and irregularity of the bilateral cervical ICAs, unchanged, which may represent senescent change and/or fibromuscular dysplasia. Pt does see a chiropractor but last adjustment was at least 2+ weeks prior to symptom onset so feel vertebral artery dissection is less likely. No significant arrhythmia or evidence of acute ischemic injury pattern on EKG. Troponin negative and he has no chest pain so doubt ACS. Peripheral pulses present and symmetric so have low suspicion for dissection. Orthostatic vitals unremarkable. No significant metabol;ic or electrolyte derangements. No acid base abnormalities. No leukocytosis and vitals are reassuring against infectious etiology.     I do have some concern for neurocognitive disorders versus Parkinson's given the mood changes, new difficulty with spatial awareness and depth perception (cutting corners while driving), pill-rolling tremor, mild cerebral ataxia on finger-nose test etc.     We trial meclizine in the ED. Based off of the new persistent dizziness, we will plan for admission for further evaluation and management.     6:08 PM - I discussed the history, physical, and treatment plan with Dr. Bernabe. Josiah Barger was signed out in stable condition. Please see Dr. Bernabe's note for further details, including diagnosis and disposition.     The patient tolerated their visit well.  The patient and / or the family were informed of the results of any tests, a time was given to

## 2025-07-22 NOTE — PLAN OF CARE
Dizziness    -check orthostatic BP  - Neuro consult  - MRI brain.      STACIA DELGADO MD 7/22/2025 6:17 PM

## 2025-07-22 NOTE — ED NOTES
Provider aware of patient and complaints  
components:       Result Value    Glucose 103 (*)     AST 13 (*)     All other components within normal limits   BLOOD GAS, VENOUS - Abnormal; Notable for the following components:    Carboxyhemoglobin 1.8 (*)     All other components within normal limits   POCT GLUCOSE - Abnormal; Notable for the following components:    POC Glucose 104 (*)     All other components within normal limits     Critical values: no  Intervention for critical value(s):     Abnormal Imaging: yes,  CT scan            You may also review the ED PT Care Timeline found under the Summary Tab, ED Encounter Summary, Timeline Reports, ED Patient Care Timeline.     Recommendation    Pending orders/Uncompleted orders to hand off:      Additional Comments:   If any further questions, please call Sending RN at [phone]

## 2025-07-23 ENCOUNTER — APPOINTMENT (OUTPATIENT)
Dept: MRI IMAGING | Age: 70
End: 2025-07-23
Payer: MEDICARE

## 2025-07-23 VITALS
TEMPERATURE: 98.1 F | WEIGHT: 156.09 LBS | RESPIRATION RATE: 18 BRPM | DIASTOLIC BLOOD PRESSURE: 67 MMHG | HEART RATE: 88 BPM | HEIGHT: 68 IN | BODY MASS INDEX: 23.66 KG/M2 | OXYGEN SATURATION: 96 % | SYSTOLIC BLOOD PRESSURE: 106 MMHG

## 2025-07-23 PROBLEM — S09.90XA CLOSED HEAD INJURY: Status: ACTIVE | Noted: 2025-07-23

## 2025-07-23 LAB
ANION GAP SERPL CALCULATED.3IONS-SCNC: 10 MMOL/L (ref 3–16)
BACTERIA UR CULT: NORMAL
BASOPHILS # BLD: 0.1 K/UL (ref 0–0.2)
BASOPHILS NFR BLD: 1.1 %
BUN SERPL-MCNC: 11 MG/DL (ref 7–20)
CALCIUM SERPL-MCNC: 9.2 MG/DL (ref 8.3–10.6)
CHLORIDE SERPL-SCNC: 106 MMOL/L (ref 99–110)
CO2 SERPL-SCNC: 25 MMOL/L (ref 21–32)
CREAT SERPL-MCNC: 0.9 MG/DL (ref 0.8–1.3)
DEPRECATED RDW RBC AUTO: 13.7 % (ref 12.4–15.4)
EOSINOPHIL # BLD: 0.1 K/UL (ref 0–0.6)
EOSINOPHIL NFR BLD: 2.4 %
GFR SERPLBLD CREATININE-BSD FMLA CKD-EPI: >90 ML/MIN/{1.73_M2}
GLUCOSE SERPL-MCNC: 92 MG/DL (ref 70–99)
HCT VFR BLD AUTO: 42.6 % (ref 40.5–52.5)
HGB BLD-MCNC: 14.5 G/DL (ref 13.5–17.5)
LYMPHOCYTES # BLD: 2.3 K/UL (ref 1–5.1)
LYMPHOCYTES NFR BLD: 39.9 %
MCH RBC QN AUTO: 32.4 PG (ref 26–34)
MCHC RBC AUTO-ENTMCNC: 34.1 G/DL (ref 31–36)
MCV RBC AUTO: 95 FL (ref 80–100)
MONOCYTES # BLD: 0.5 K/UL (ref 0–1.3)
MONOCYTES NFR BLD: 8 %
NEUTROPHILS # BLD: 2.8 K/UL (ref 1.7–7.7)
NEUTROPHILS NFR BLD: 48.6 %
PLATELET # BLD AUTO: 254 K/UL (ref 135–450)
PMV BLD AUTO: 6.8 FL (ref 5–10.5)
POTASSIUM SERPL-SCNC: 4.3 MMOL/L (ref 3.5–5.1)
RBC # BLD AUTO: 4.49 M/UL (ref 4.2–5.9)
SODIUM SERPL-SCNC: 141 MMOL/L (ref 136–145)
WBC # BLD AUTO: 5.7 K/UL (ref 4–11)

## 2025-07-23 PROCEDURE — 80048 BASIC METABOLIC PNL TOTAL CA: CPT

## 2025-07-23 PROCEDURE — G0378 HOSPITAL OBSERVATION PER HR: HCPCS

## 2025-07-23 PROCEDURE — 70551 MRI BRAIN STEM W/O DYE: CPT

## 2025-07-23 PROCEDURE — 85025 COMPLETE CBC W/AUTO DIFF WBC: CPT

## 2025-07-23 PROCEDURE — 36415 COLL VENOUS BLD VENIPUNCTURE: CPT

## 2025-07-23 PROCEDURE — APPSS45 APP SPLIT SHARED TIME 31-45 MINUTES

## 2025-07-23 PROCEDURE — 97161 PT EVAL LOW COMPLEX 20 MIN: CPT

## 2025-07-23 PROCEDURE — 2500000003 HC RX 250 WO HCPCS: Performed by: INTERNAL MEDICINE

## 2025-07-23 PROCEDURE — 99238 HOSP IP/OBS DSCHRG MGMT 30/<: CPT | Performed by: INTERNAL MEDICINE

## 2025-07-23 PROCEDURE — 6370000000 HC RX 637 (ALT 250 FOR IP): Performed by: INTERNAL MEDICINE

## 2025-07-23 PROCEDURE — 92610 EVALUATE SWALLOWING FUNCTION: CPT

## 2025-07-23 RX ADMIN — LAMOTRIGINE 100 MG: 100 TABLET ORAL at 13:20

## 2025-07-23 RX ADMIN — Medication 10 ML: at 08:59

## 2025-07-23 RX ADMIN — LAMOTRIGINE 100 MG: 100 TABLET ORAL at 08:59

## 2025-07-23 RX ADMIN — ASPIRIN 81 MG: 81 TABLET, COATED ORAL at 08:59

## 2025-07-23 NOTE — PROGRESS NOTES
Shift report given to Betsy at bedside. Patient is stable. All end of shift needs have been met. No further assistance needed at this time.

## 2025-07-23 NOTE — CARE COORDINATION
Patient admitted as Observation/OPIB with an anticipated short hospitalization length of stay.     Chart reviewed no needs identified for discharge at this time.     Discussed with patient’s nurse and requested that case management be notified if discharge needs are identified.     *Case management will continue to follow progress and update discharge plan as needed.    1505 - Reviewed chart, noted DC order, met with pt at bedside. Pt to be DC home today, family providing transportation. Denies and HC/DME at DC.

## 2025-07-23 NOTE — PROGRESS NOTES
PIV removed for discharge. Belongings packed up. Discharge instructions reviewed and copy given to pt. Pt denies questions or concerns. Wife here to pick patient up. Transport order placed.

## 2025-07-23 NOTE — DISCHARGE SUMMARY
(Seizure) for up to 30 days. Max Daily Amount: 2 mg     fish oil-omega-3 fatty acids 1000 MG capsule     lamoTRIgine 100 MG tablet  Commonly known as: LAMICTAL  TAKE 1 TABLET BY MOUTH 3 TIMES A DAY IN THE MORNING, AT NOON, AND AT BEDTIME                Discharge Condition/Location: Stable    Follow Up:  Follow up with PCP and neurologist.        STACIA DELGADO MD 7/23/2025 3:31 PM

## 2025-07-23 NOTE — PROGRESS NOTES
Shift assessment completed. VSS. Patient A/OX4. Pt denies pain or N/V/D. Scheduled meds given, see MAR. Orthostatic BPs completed per orders. Pt denies further needs at this time. Call light within reach. Bed alarm on.

## 2025-07-23 NOTE — PROGRESS NOTES
Inpatient Physical Therapy Evaluation, Treatment, & Discharge Summary    Unit: [facility]  Date:  7/23/2025  Patient Name:    Josiah Barger  Admitting diagnosis:  Dizziness [R42]  Admit Date:  7/22/2025  Precautions/Restrictions/WB Status/ Lines/ Wounds/ Oxygen: Fall risk and Lines (IV)      Treatment Time:  4596-3526  Treatment Number:  1   Timed Code Treatment Minutes: 0 minutes  Total Treatment Minutes:  15  minutes    Patient Stated Goals for Therapy: nones stated          Discharge Recommendations: Home independently  DME needs for discharge: Needs Met       Therapy recommendation for EMS Transport: can transport by wheelchair    Therapy recommendations for staff:   Independent for ambulation with use of No AD to/from chair  to/from bathroom  within room    History of Present Illness:   Per H&P 7/22/25 from Dr. Pierson:  \"69 y.o. male who presented to [facility] with fall and dizziness.  PMHx significant for seizure disorder, temporal arteritis, hypertension, history of TIA, hyperlipidemia.       Patient seen at bedside in the emergency department alone.  He is a poor historian.  He is alert and oriented x 3 but very unsure of history of events.  Documentation in the ED noted that patient was experiencing \"dizziness\" prior to his fall but patient unsure on my exam.  He noted that he was sitting on his couch around noon on day prior to admission and stood up abruptly and subsequently had lightheadedness and fell forward and he tried to catch himself but ended up hitting his head against the coffee table in the left upper side of his head.  This fall was unwitnessed.  He was able to get up afterwards without any issues.  He denies any prior history of lightheadedness like this before.  Said it persisted and so he came to emergency department.  Noted that it has improved a little bit since being in the hospital.     He denies any any recent illnesses, fevers or chills.  Denies any new medications or increased

## 2025-07-23 NOTE — PLAN OF CARE
Problem: Discharge Planning  Goal: Discharge to home or other facility with appropriate resources  Outcome: Progressing  Flowsheets (Taken 7/22/2025 2208)  Discharge to home or other facility with appropriate resources: Identify barriers to discharge with patient and caregiver     Problem: Safety - Adult  Goal: Free from fall injury  Outcome: Progressing

## 2025-07-23 NOTE — FLOWSHEET NOTE
07/23/25 0852   Vital Signs   Temp 97.9 °F (36.6 °C)   Temp Source Oral   Pulse 66   Heart Rate Source Monitor   Respirations 18   /70   MAP (Calculated) 88   BP Location Right upper arm   BP Method Automatic   Patient Position Supine   Orthostatic B/P and Pulse? Yes   Blood Pressure Lying 124/70   Pulse Lying 66 PER MINUTE   Blood Pressure Sitting 133/74   Pulse Sitting 78 PER MINUTE   Blood Pressure Standing 131/74   Pulse Standing 82 PER MINUTE   Pain Assessment   Pain Assessment None - Denies Pain   Opioid-Induced Sedation   POSS Score 1   RASS   Cyr Agitation Sedation Scale (RASS) 0   Oxygen Therapy   SpO2 100 %   O2 Device None (Room air)     Orthostatic BPs completed

## 2025-07-23 NOTE — PROGRESS NOTES
Occupational Therapy    Orders received, chart reviewed.  Screened pt in his room.  Able to complete basic ADLs independently and ambulating in room independently.  No further acute OT needs indicated at this time.  Will discontinue current order.  If new concerns arise, please reorder.    Lucretia Sims, OTR/L #[id number]

## 2025-07-23 NOTE — PLAN OF CARE
Problem: Discharge Planning  Goal: Discharge to home or other facility with appropriate resources  7/23/2025 1507 by Zabrina John, RN  Outcome: Adequate for Discharge  7/23/2025 1132 by Zabrina John RN  Outcome: Progressing  7/23/2025 0141 by Austin Woodruff RN  Outcome: Progressing  Flowsheets (Taken 7/22/2025 2208)  Discharge to home or other facility with appropriate resources: Identify barriers to discharge with patient and caregiver     Problem: Safety - Adult  Goal: Free from fall injury  7/23/2025 1507 by Zabrina Jonh, RN  Outcome: Adequate for Discharge  7/23/2025 1132 by Zabrina John RN  Outcome: Progressing  7/23/2025 0141 by Austin Woodruff RN  Outcome: Progressing

## 2025-07-23 NOTE — PROGRESS NOTES
Speech Language Pathology  Swallowing Disorders and Dysphagia  Clinical Bedside Swallow Assessment  Facility/Department: [address] MEDICAL-SURGICAL    Instrumentation: Not clinically indicated at this time  Diet recommendation: IDDSI 7 Regular Solids; IDDSI 0 Thin Liquids; Meds PO as tolerated  Risk management: upright for all intake, stay upright for at least 30 mins after intake, small bites/sips, oral care 2-3x/day to reduce adverse affects in the event of aspiration, increase physical mobility as able, slow rate of intake, general aspiration precautions, and hold PO and contact SLP if s/s of aspiration or worsening respiratory status develop.      NAME:Josiah Barger  : 1955 (69 y.o.)   MRN: [id number]  ROOM: [id number]/[id number]  ADMISSION DATE: 2025  Chief Complaint   Patient presents with    Fall     Hit head yesterday on coffee table, since happened hasn't felt well. Really tired, alert and oriented x4 in triage. Dizziness since 1200 yesterday, before fall     Past Medical History:   Diagnosis Date    Abdominal pain     COVID-19     Degenerative disc disease     Hyperlipidemia     Hypertension      Past Surgical History:   Procedure Laterality Date    EYE SURGERY Left     cataract    HERNIA REPAIR           DATE ONSET: 2025    Date of Evaluation: 2025   Evaluating Therapist: Oumou Khoury, JOSE    Chart Reviewed: : [x] Yes [] No     Pain: The patient does not complain of pain    RN Ok'd SLP Entry: Yes [x]  Requested Hold []  RN Name: Betsy      Data Review:        Current Diet: ADULT DIET; Regular    Lab Values:    CBC:  Lab Results   Component Value Date    WBC 5.7 2025    HGB 14.5 2025    HCT 42.6 2025    MCV 95.0 2025     2025       Basic Metabolic Panel  Lab Results   Component Value Date/Time     2025 05:41 AM     2025 05:41 AM    CO2 25 2025 05:41 AM    GLUCOSE 92 2025 05:41 AM    BUN 11 2025 05:41 AM

## 2025-07-23 NOTE — CONSULTS
Inpatient Neurology consult        [facility] Neurology      Josiah Barger  1955    Date of Service: 7/23/2025    Referring Physician: Aracely Crowley,*      Reason for the consult and CC: Dizziness    HPI:   The patient is a 69 y.o. male with a past medical history of hypertension, hyperlipidemia, degenerative disc disease, and seizures (on lamotrigine) originally presenting to the hospital for concerns of syncopal episode.  Patient reports he returned home from work late yesterday (7/22) and was laying on the couch.  Later in the night he went to stand up to walk to bed and immediately felt lightheaded and as if he was going to fall shortly followed he brief loss of consciousness of approximately 2 to 3 seconds and hitting his head on the coffee table.  Patient states that he immediately got up and was able to ambulate up the stairs into his bedroom and go to sleep.  He denies any dizziness, headache, palpitations, tinnitus, lightheadedness, visual changes, unilateral weakness, slurred speech, or loss of bladder or bowel.  Patient states his seizures are typically characterized by left-sided numbness and states he has been seizure-free for several months up to a year with no missed doses of his home lamotrigine.  Neurology has been consulted for further evaluation and management of dizziness.       Constitutional:   Vitals:    07/22/25 1933 07/22/25 2005 07/23/25 0245 07/23/25 0852   BP: 138/83  114/78 124/70   Pulse: 61  61 66   Resp: 12 18 16 18   Temp:  98.7 °F (37.1 °C) 97.3 °F (36.3 °C) 97.9 °F (36.6 °C)   TempSrc:  Oral Oral Oral   SpO2: 98% 95% 98% 100%   Weight:  70.8 kg (156 lb 1.4 oz)     Height:  1.727 m (5' 8\")       I personally reviewed and updated social history, past medical history, medications, allergy, surgical history, and family history as documented in the patient's electronic health records.     ROS: 10-14 ROS reviewed with the patient/nurse/family which were

## 2025-07-23 NOTE — PROGRESS NOTES

## 2025-07-23 NOTE — FLOWSHEET NOTE
07/22/25 2005   Vital Signs   Temp Source Oral   Respirations 18   Orthostatic B/P and Pulse? Yes   Blood Pressure Lying 146/87   Pulse Lying 74 PER MINUTE   Blood Pressure Sitting 146/84   Pulse Sitting 60 PER MINUTE   Blood Pressure Standing 152/81   Pulse Standing 78 PER MINUTE   Pain Assessment   Pain Assessment None - Denies Pain   Opioid-Induced Sedation   POSS Score 1   RASS   Cyr Agitation Sedation Scale (RASS) 0   Oxygen Therapy   SpO2 95 %   O2 Device None (Room air)   Height and Weight   Height 1.727 m (5' 8\")   Weight - Scale 70.8 kg (156 lb 1.4 oz)   Weight Method Bed scale   BSA (Calculated - sq m) 1.84 sq meters   BMI (Calculated) 23.8     Received from ER, awake, alert, oriented, via stretcher, accompanied by transport. On telemetry. Admission assessment complete.  Vital signs are stable.  Orthostatic blood pressure done. CHG wipes done by patient, changed to gown. Instructed to be on NPO. Pt denies any needs at this time.  Call light within reach.

## 2025-07-23 NOTE — PROGRESS NOTES
4 Eyes Skin Assessment     NAME:  Josiah Barger  YOB: 1955  MEDICAL RECORD NUMBER:  [id number]    The patient is being assessed for  Admission    I agree that at least one RN has performed a thorough Head to Toe Skin Assessment on the patient. ALL assessment sites listed below have been assessed.      Areas assessed by both nurses:    Head, Face, Ears, Shoulders, Back, Chest, Arms, Elbows, Hands, Sacrum. Buttock, Coccyx, Ischium, Legs. Feet and Heels, and Under Medical Devices     Slight redness and swelling left forehead        Does the Patient have a Wound? No noted wound(s)       Wes Prevention initiated by RN: No  Wound Care Orders initiated by RN: No    For hospital-acquired stage 1 & 2 and ALL Stage 3,4, Unstageable, DTI, NWPT, and Complex wounds: place order “IP Wound Care/Ostomy Nurse Eval and Treat” by RN under : No    New Ostomies, if present place, Ostomy referral order under : No     Nurse 1 eSignature: Electronically signed by uAstin Woodruff RN on 7/23/25 at 1:23 AM EDT    **SHARE this note so that the co-signing nurse can place an eSignature**    Nurse 2 eSignature: {Esignature:[id number]}

## 2025-07-23 NOTE — PLAN OF CARE
Problem: Discharge Planning  Goal: Discharge to home or other facility with appropriate resources  7/23/2025 1132 by Zabrina John, RN  Outcome: Progressing  7/23/2025 0141 by Austin Woodruff, RN  Outcome: Progressing  Flowsheets (Taken 7/22/2025 2208)  Discharge to home or other facility with appropriate resources: Identify barriers to discharge with patient and caregiver     Problem: Safety - Adult  Goal: Free from fall injury  7/23/2025 1132 by Zabrina John, RN  Outcome: Progressing  7/23/2025 0141 by Austin Woodruff, RN  Outcome: Progressing

## 2025-07-29 ENCOUNTER — PROCEDURE VISIT (OUTPATIENT)
Dept: AUDIOLOGY | Age: 70
End: 2025-07-29
Payer: MEDICARE

## 2025-07-29 DIAGNOSIS — H93.13 TINNITUS, BILATERAL: ICD-10-CM

## 2025-07-29 DIAGNOSIS — H90.3 SENSORINEURAL HEARING LOSS, BILATERAL: Primary | ICD-10-CM

## 2025-07-29 PROCEDURE — 92567 TYMPANOMETRY: CPT | Performed by: AUDIOLOGIST

## 2025-07-29 PROCEDURE — 92557 COMPREHENSIVE HEARING TEST: CPT | Performed by: AUDIOLOGIST
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
## 2025-08-01 ENCOUNTER — HOSPITAL ENCOUNTER (OUTPATIENT)
Dept: CT IMAGING | Age: 70
Discharge: HOME | End: 2025-08-01
Payer: MEDICARE

## 2025-08-01 DIAGNOSIS — R07.89 INTERMITTENT RIGHT-SIDED CHEST PAIN: ICD-10-CM

## 2025-08-01 PROCEDURE — 71250 CT THORAX DX C-: CPT
